# Patient Record
Sex: FEMALE | Race: WHITE | NOT HISPANIC OR LATINO | Employment: FULL TIME | URBAN - METROPOLITAN AREA
[De-identification: names, ages, dates, MRNs, and addresses within clinical notes are randomized per-mention and may not be internally consistent; named-entity substitution may affect disease eponyms.]

---

## 2019-06-12 ENCOUNTER — HOSPITAL ENCOUNTER (OUTPATIENT)
Dept: URGENT CARE | Facility: CLINIC | Age: 34
Discharge: HOME | End: 2019-06-12
Attending: FAMILY MEDICINE

## 2019-10-09 ENCOUNTER — HOSPITAL ENCOUNTER (OUTPATIENT)
Dept: URGENT CARE | Facility: CLINIC | Age: 34
Discharge: HOME | End: 2019-10-09

## 2019-10-11 LAB — BACTERIA SPEC AEROBE CULT: NORMAL

## 2020-02-18 ENCOUNTER — HOSPITAL ENCOUNTER (OUTPATIENT)
Dept: LAB | Facility: HOSPITAL | Age: 35
Discharge: HOME | End: 2020-02-18
Attending: NURSE PRACTITIONER

## 2020-02-18 LAB
25(OH)D3 SERPL-MCNC: 78.5 NG/ML (ref 30–100)
ALBUMIN SERPL-MCNC: 3.9 G/DL (ref 3.5–5)
ALBUMIN/GLOB SERPL: 1.5 {RATIO} (ref 1.4–2.6)
ALP SERPL-CCNC: 71 U/L (ref 42–98)
ALT SERPL-CCNC: 74 U/L (ref 10–40)
ANION GAP SERPL CALC-SCNC: 19 MMOL/L (ref 8–19)
AST SERPL-CCNC: 66 U/L (ref 15–50)
BASOPHILS # BLD AUTO: 0.04 10*3/UL (ref 0–0.2)
BASOPHILS NFR BLD AUTO: 0.6 % (ref 0–3)
BILIRUB SERPL-MCNC: 0.23 MG/DL (ref 0.2–1.3)
BUN SERPL-MCNC: 14 MG/DL (ref 5–25)
BUN/CREAT SERPL: 22 {RATIO} (ref 6–20)
CALCIUM SERPL-MCNC: 9.3 MG/DL (ref 8.7–10.4)
CHLORIDE SERPL-SCNC: 101 MMOL/L (ref 99–111)
CHOLEST SERPL-MCNC: 192 MG/DL (ref 107–200)
CHOLEST/HDLC SERPL: 4.1 {RATIO} (ref 3–6)
CONV ABS IMM GRAN: 0.03 10*3/UL (ref 0–0.2)
CONV CO2: 22 MMOL/L (ref 22–32)
CONV IMMATURE GRAN: 0.5 % (ref 0–1.8)
CONV TOTAL PROTEIN: 6.5 G/DL (ref 6.3–8.2)
CREAT UR-MCNC: 0.64 MG/DL (ref 0.5–0.9)
DEPRECATED RDW RBC AUTO: 40.6 FL (ref 36.4–46.3)
EOSINOPHIL # BLD AUTO: 0.17 10*3/UL (ref 0–0.7)
EOSINOPHIL # BLD AUTO: 2.6 % (ref 0–7)
ERYTHROCYTE [DISTWIDTH] IN BLOOD BY AUTOMATED COUNT: 11.9 % (ref 11.7–14.4)
EST. AVERAGE GLUCOSE BLD GHB EST-MCNC: 117 MG/DL
FOLATE SERPL-MCNC: >20 NG/ML (ref 4.8–20)
GFR SERPLBLD BASED ON 1.73 SQ M-ARVRAT: >60 ML/MIN/{1.73_M2}
GLOBULIN UR ELPH-MCNC: 2.6 G/DL (ref 2–3.5)
GLUCOSE SERPL-MCNC: 105 MG/DL (ref 65–99)
HBA1C MFR BLD: 5.7 % (ref 3.5–5.7)
HCT VFR BLD AUTO: 45 % (ref 37–47)
HDLC SERPL-MCNC: 47 MG/DL (ref 40–60)
HGB BLD-MCNC: 14.6 G/DL (ref 12–16)
LDLC SERPL CALC-MCNC: 101 MG/DL (ref 70–100)
LYMPHOCYTES # BLD AUTO: 2.41 10*3/UL (ref 1–5)
LYMPHOCYTES NFR BLD AUTO: 37 % (ref 20–45)
MCH RBC QN AUTO: 30.4 PG (ref 27–31)
MCHC RBC AUTO-ENTMCNC: 32.4 G/DL (ref 33–37)
MCV RBC AUTO: 93.6 FL (ref 81–99)
MONOCYTES # BLD AUTO: 0.47 10*3/UL (ref 0.2–1.2)
MONOCYTES NFR BLD AUTO: 7.2 % (ref 3–10)
NEUTROPHILS # BLD AUTO: 3.39 10*3/UL (ref 2–8)
NEUTROPHILS NFR BLD AUTO: 52.1 % (ref 30–85)
NRBC CBCN: 0 % (ref 0–0.7)
OSMOLALITY SERPL CALC.SUM OF ELEC: 287 MOSM/KG (ref 273–304)
PLATELET # BLD AUTO: 235 10*3/UL (ref 130–400)
PMV BLD AUTO: 9.6 FL (ref 9.4–12.3)
POTASSIUM SERPL-SCNC: 4 MMOL/L (ref 3.5–5.3)
RBC # BLD AUTO: 4.81 10*6/UL (ref 4.2–5.4)
SODIUM SERPL-SCNC: 138 MMOL/L (ref 135–147)
T4 FREE SERPL-MCNC: 1.1 NG/DL (ref 0.9–1.8)
TRIGL SERPL-MCNC: 220 MG/DL (ref 40–150)
TSH SERPL-ACNC: 3.65 M[IU]/L (ref 0.27–4.2)
VIT B12 SERPL-MCNC: 705 PG/ML (ref 211–911)
VLDLC SERPL-MCNC: 44 MG/DL (ref 5–37)
WBC # BLD AUTO: 6.51 10*3/UL (ref 4.8–10.8)

## 2020-06-17 ENCOUNTER — HOSPITAL ENCOUNTER (OUTPATIENT)
Dept: LAB | Facility: HOSPITAL | Age: 35
Discharge: HOME | End: 2020-06-17
Attending: NURSE PRACTITIONER

## 2020-06-17 LAB
BASOPHILS # BLD AUTO: 0.04 10*3/UL (ref 0–0.2)
BASOPHILS NFR BLD AUTO: 0.7 % (ref 0–3)
CONV ABS IMM GRAN: 0.01 10*3/UL (ref 0–0.2)
CONV IMMATURE GRAN: 0.2 % (ref 0–1.8)
DEPRECATED RDW RBC AUTO: 39.7 FL (ref 36.4–46.3)
EOSINOPHIL # BLD AUTO: 0.13 10*3/UL (ref 0–0.7)
EOSINOPHIL # BLD AUTO: 2.2 % (ref 0–7)
ERYTHROCYTE [DISTWIDTH] IN BLOOD BY AUTOMATED COUNT: 11.8 % (ref 11.7–14.4)
ERYTHROCYTE [SEDIMENTATION RATE] IN BLOOD: 8 MM/H (ref 0–20)
HCT VFR BLD AUTO: 45 % (ref 37–47)
HGB BLD-MCNC: 15 G/DL (ref 12–16)
LYMPHOCYTES # BLD AUTO: 1.81 10*3/UL (ref 1–5)
LYMPHOCYTES NFR BLD AUTO: 30.6 % (ref 20–45)
MCH RBC QN AUTO: 30.7 PG (ref 27–31)
MCHC RBC AUTO-ENTMCNC: 33.3 G/DL (ref 33–37)
MCV RBC AUTO: 92 FL (ref 81–99)
MONOCYTES # BLD AUTO: 0.46 10*3/UL (ref 0.2–1.2)
MONOCYTES NFR BLD AUTO: 7.8 % (ref 3–10)
NEUTROPHILS # BLD AUTO: 3.47 10*3/UL (ref 2–8)
NEUTROPHILS NFR BLD AUTO: 58.5 % (ref 30–85)
NRBC CBCN: 0 % (ref 0–0.7)
PLATELET # BLD AUTO: 235 10*3/UL (ref 130–400)
PMV BLD AUTO: 9.7 FL (ref 9.4–12.3)
RBC # BLD AUTO: 4.89 10*6/UL (ref 4.2–5.4)
WBC # BLD AUTO: 5.92 10*3/UL (ref 4.8–10.8)

## 2020-06-18 LAB
ASO AB SERPL-ACNC: 23 [IU]/ML (ref 0–200)
CONV RHEUMATOID FACTOR IGM: <10 [IU]/ML (ref 0–14)
CRP SERPL-MCNC: 15.1 MG/L (ref 0–5)
DSDNA AB SER-ACNC: NEGATIVE [IU]/ML
ENA AB SER IA-ACNC: NEGATIVE {RATIO}
URATE SERPL-MCNC: 6.5 MG/DL (ref 2.5–7.5)

## 2020-06-19 LAB
B BURGDOR IGG+IGM SER-ACNC: <0.91 ISR (ref 0–0.9)
CONV ANTI MICROSOMAL AB: <9 IU/ML (ref 0–34)

## 2020-09-23 ENCOUNTER — HOSPITAL ENCOUNTER (OUTPATIENT)
Dept: URGENT CARE | Facility: CLINIC | Age: 35
Discharge: HOME | End: 2020-09-23
Attending: EMERGENCY MEDICINE

## 2020-11-04 ENCOUNTER — HOSPITAL ENCOUNTER (OUTPATIENT)
Dept: URGENT CARE | Facility: CLINIC | Age: 35
Discharge: HOME | End: 2020-11-04
Attending: NURSE PRACTITIONER

## 2020-11-23 ENCOUNTER — HOSPITAL ENCOUNTER (OUTPATIENT)
Dept: URGENT CARE | Facility: CLINIC | Age: 35
Discharge: HOME | End: 2020-11-23

## 2020-11-25 LAB — BACTERIA UR CULT: NORMAL

## 2021-08-24 ENCOUNTER — TRANSCRIBE ORDERS (OUTPATIENT)
Dept: ADMINISTRATIVE | Facility: HOSPITAL | Age: 36
End: 2021-08-24

## 2021-08-24 DIAGNOSIS — R05.3 CHRONIC COUGH: Primary | ICD-10-CM

## 2021-08-24 DIAGNOSIS — J18.9 CHRONIC PNEUMONIA: ICD-10-CM

## 2021-08-24 DIAGNOSIS — R07.89 CHEST TIGHTNESS: ICD-10-CM

## 2021-11-29 ENCOUNTER — HOSPITAL ENCOUNTER (OUTPATIENT)
Dept: RESPIRATORY THERAPY | Facility: HOSPITAL | Age: 36
Discharge: HOME | End: 2021-11-29
Admitting: NURSE PRACTITIONER

## 2021-11-29 DIAGNOSIS — R05.3 CHRONIC COUGH: ICD-10-CM

## 2021-11-29 DIAGNOSIS — J18.9 CHRONIC PNEUMONIA: ICD-10-CM

## 2021-11-29 DIAGNOSIS — R07.89 CHEST TIGHTNESS: ICD-10-CM

## 2021-11-29 PROCEDURE — 94060 EVALUATION OF WHEEZING: CPT

## 2021-11-29 PROCEDURE — 94060 EVALUATION OF WHEEZING: CPT | Performed by: INTERNAL MEDICINE

## 2021-11-29 RX ORDER — ALBUTEROL SULFATE 2.5 MG/3ML
2.5 SOLUTION RESPIRATORY (INHALATION) ONCE
Status: COMPLETED | OUTPATIENT
Start: 2021-11-29 | End: 2021-11-29

## 2021-11-29 RX ADMIN — ALBUTEROL SULFATE 2.5 MG: 2.5 SOLUTION RESPIRATORY (INHALATION) at 12:53

## 2022-01-17 ENCOUNTER — OFFICE VISIT (OUTPATIENT)
Dept: GASTROENTEROLOGY | Facility: CLINIC | Age: 37
End: 2022-01-17

## 2022-01-17 VITALS
TEMPERATURE: 98.7 F | DIASTOLIC BLOOD PRESSURE: 86 MMHG | WEIGHT: 186.8 LBS | HEIGHT: 57 IN | HEART RATE: 99 BPM | SYSTOLIC BLOOD PRESSURE: 123 MMHG | OXYGEN SATURATION: 97 % | BODY MASS INDEX: 40.3 KG/M2

## 2022-01-17 DIAGNOSIS — R10.33 PERIUMBILICAL ABDOMINAL PAIN: ICD-10-CM

## 2022-01-17 DIAGNOSIS — R19.7 DIARRHEA, UNSPECIFIED TYPE: ICD-10-CM

## 2022-01-17 DIAGNOSIS — R79.89 ELEVATED LFTS: Primary | ICD-10-CM

## 2022-01-17 DIAGNOSIS — R53.81 MALAISE AND FATIGUE: ICD-10-CM

## 2022-01-17 DIAGNOSIS — R53.83 MALAISE AND FATIGUE: ICD-10-CM

## 2022-01-17 DIAGNOSIS — R11.0 NAUSEA: ICD-10-CM

## 2022-01-17 PROCEDURE — 99204 OFFICE O/P NEW MOD 45 MIN: CPT | Performed by: INTERNAL MEDICINE

## 2022-01-17 RX ORDER — MULTIPLE VITAMINS W/ MINERALS TAB 9MG-400MCG
1 TAB ORAL DAILY
COMMUNITY

## 2022-01-17 RX ORDER — LISINOPRIL 10 MG/1
10 TABLET ORAL DAILY
COMMUNITY
Start: 2021-10-18

## 2022-01-17 RX ORDER — MULTIVIT WITH MINERALS/LUTEIN
4000 TABLET ORAL DAILY
COMMUNITY
End: 2022-06-22

## 2022-01-17 RX ORDER — MONTELUKAST SODIUM 10 MG/1
10 TABLET ORAL NIGHTLY
COMMUNITY
Start: 2021-11-18

## 2022-01-17 RX ORDER — TOLTERODINE 4 MG/1
4 CAPSULE, EXTENDED RELEASE ORAL DAILY
COMMUNITY

## 2022-01-17 RX ORDER — ESTRADIOL 2 MG/1
2 TABLET ORAL DAILY
COMMUNITY
Start: 2021-10-27

## 2022-01-17 RX ORDER — SOLIFENACIN SUCCINATE 10 MG/1
10 TABLET, FILM COATED ORAL DAILY
COMMUNITY

## 2022-01-17 RX ORDER — OMEPRAZOLE 40 MG/1
40 CAPSULE, DELAYED RELEASE ORAL DAILY
COMMUNITY
Start: 2021-11-18

## 2022-01-17 RX ORDER — ERGOCALCIFEROL 1.25 MG/1
CAPSULE ORAL
COMMUNITY
End: 2022-06-22

## 2022-01-17 RX ORDER — LEVOCETIRIZINE DIHYDROCHLORIDE 5 MG/1
5 TABLET, FILM COATED ORAL EVERY EVENING
COMMUNITY
Start: 2021-10-18

## 2022-01-17 NOTE — PROGRESS NOTES
"Chief Complaint   Patient presents with   • Celiac Disease   • Abdominal Pain   • Migraine   • Nausea   • Constipation   • Diarrhea       History of Present Illness:   36 y.o. female NO abdominal pain. Lots of headaches, nausea, she has alternating diarrhea and constipation. Fatigue. She had an EGD in . No rectal bleeeding or melena. Her weight fluctuates but has been stable. Denies NSAIDs use. She has 0-1 BM/day. May have 2 days of diarrhea.        She supposedly had a celiac sprue antibody test done by her PCP that was positive. She was also noted to have elevated LFT\"s. She was told that she has chronic mononuclesis. She itches for most of her life. She has a rash. Nonsmoker. No ETOH. 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 . LMP  when she had a hysterectomy. Has endometriosis. .     Past Medical History:   Diagnosis Date   • GERD (gastroesophageal reflux disease)    • Kidney stones    • Monocephalus    • PCOS (polycystic ovarian syndrome)        Past Surgical History:   Procedure Laterality Date   • CHOLECYSTECTOMY     • FOOT SURGERY     • HYSTERECTOMY     • UPPER GASTROINTESTINAL ENDOSCOPY           Current Outpatient Medications:   •  ergocalciferol (ERGOCALCIFEROL) 1.25 MG (85126 UT) capsule, , Disp: , Rfl:   •  estradiol (ESTRACE) 2 MG tablet, , Disp: , Rfl:   •  levocetirizine (XYZAL) 5 MG tablet, , Disp: , Rfl:   •  lisinopril (PRINIVIL,ZESTRIL) 10 MG tablet, , Disp: , Rfl:   •  metFORMIN (GLUCOPHAGE) 500 MG tablet, , Disp: , Rfl:   •  montelukast (SINGULAIR) 10 MG tablet, , Disp: , Rfl:   •  multivitamin with minerals tablet tablet, Take 1 tablet by mouth Daily., Disp: , Rfl:   •  omeprazole (priLOSEC) 40 MG capsule, , Disp: , Rfl:   •  solifenacin (VESICARE) 10 MG tablet, Take 10 mg by mouth Daily., Disp: , Rfl:   •  tolterodine LA (DETROL LA) 4 MG 24 hr capsule, Take 4 mg by mouth Daily., Disp: , Rfl:   •  vitamin C (ASCORBIC ACID) 250 MG tablet, Take 4,000 mg by mouth Daily., Disp: , Rfl:     No " Known Allergies    Family History   Problem Relation Age of Onset   • Colon cancer Maternal Grandmother    • Pancreatic cancer Paternal Grandmother        Social History     Socioeconomic History   • Marital status:    Tobacco Use   • Smoking status: Never Smoker   • Smokeless tobacco: Never Used   Substance and Sexual Activity   • Alcohol use: Never   • Drug use: Never       Review of Systems   Gastrointestinal: Positive for constipation and diarrhea.   All other systems reviewed and are negative.    Pertinent positives and negatives documented in the HPI and all other systems reviewed and were found to be negative.  Vitals:    01/17/22 1047   BP: 123/86   Pulse: 99   Temp: 98.7 °F (37.1 °C)   SpO2: 97%       Physical Exam  Vitals reviewed.   Constitutional:       General: She is not in acute distress.     Appearance: Normal appearance. She is well-developed. She is not diaphoretic.   HENT:      Head: Normocephalic and atraumatic. Hair is normal.      Right Ear: Hearing, tympanic membrane, ear canal and external ear normal. No decreased hearing noted. No drainage.      Left Ear: Hearing, tympanic membrane, ear canal and external ear normal. No decreased hearing noted.      Nose: Nose normal. No nasal deformity.      Mouth/Throat:      Mouth: Mucous membranes are moist.   Eyes:      General: Lids are normal.         Right eye: No discharge.         Left eye: No discharge.      Extraocular Movements: Extraocular movements intact.      Conjunctiva/sclera: Conjunctivae normal.      Pupils: Pupils are equal, round, and reactive to light.   Neck:      Thyroid: No thyromegaly.      Vascular: No JVD.      Trachea: No tracheal deviation.   Cardiovascular:      Rate and Rhythm: Normal rate and regular rhythm.      Pulses: Normal pulses.      Heart sounds: Normal heart sounds. No murmur heard.  No friction rub. No gallop.    Pulmonary:      Effort: Pulmonary effort is normal. No respiratory distress.      Breath  sounds: Normal breath sounds. No wheezing or rales.   Chest:      Chest wall: No tenderness.   Abdominal:      General: Bowel sounds are normal. There is no distension.      Palpations: Abdomen is soft. There is no mass.      Tenderness: There is no abdominal tenderness. There is no guarding or rebound.      Hernia: No hernia is present.   Genitourinary:     Rectum: Normal. Guaiac result negative.   Musculoskeletal:         General: No tenderness or deformity. Normal range of motion.      Cervical back: Normal range of motion and neck supple.   Lymphadenopathy:      Cervical: No cervical adenopathy.   Skin:     General: Skin is warm and dry.      Findings: No erythema or rash.   Neurological:      Mental Status: She is alert and oriented to person, place, and time.      Cranial Nerves: No cranial nerve deficit.      Motor: No abnormal muscle tone.      Coordination: Coordination normal.      Deep Tendon Reflexes: Reflexes are normal and symmetric. Reflexes normal.   Psychiatric:         Mood and Affect: Mood normal.         Behavior: Behavior normal.         Thought Content: Thought content normal.         Judgment: Judgment normal.         Diagnoses and all orders for this visit:    1. Elevated LFTs (Primary)  -     CBC & Differential  -     Comprehensive Metabolic Panel  -     Celiac Ab tTG DGP TIgA  -     TSH  -     Alpha - 1 - Antitrypsin  -     IRENE  -     Anti-Smooth Muscle Antibody Titer  -     Ceruloplasmin  -     Mitochondrial Antibodies, M2  -     Protein Elec + Interp, Serum  -     Hepatitis Panel, Acute  -     CT Abdomen Pelvis With Contrast; Future    2. Diarrhea, unspecified type  -     CBC & Differential  -     Comprehensive Metabolic Panel  -     Celiac Ab tTG DGP TIgA  -     TSH  -     Alpha - 1 - Antitrypsin  -     IRENE  -     Anti-Smooth Muscle Antibody Titer  -     Ceruloplasmin  -     Mitochondrial Antibodies, M2  -     Protein Elec + Interp, Serum  -     Hepatitis Panel, Acute    3. Malaise and  "fatigue  -     CBC & Differential  -     Comprehensive Metabolic Panel  -     Celiac Ab tTG DGP TIgA  -     TSH  -     Alpha - 1 - Antitrypsin  -     IRENE  -     Anti-Smooth Muscle Antibody Titer  -     Ceruloplasmin  -     Mitochondrial Antibodies, M2  -     Protein Elec + Interp, Serum  -     Hepatitis Panel, Acute    4. Nausea  -     CBC & Differential  -     Comprehensive Metabolic Panel  -     Celiac Ab tTG DGP TIgA  -     TSH  -     Alpha - 1 - Antitrypsin  -     IRENE  -     Anti-Smooth Muscle Antibody Titer  -     Ceruloplasmin  -     Mitochondrial Antibodies, M2  -     Protein Elec + Interp, Serum  -     Hepatitis Panel, Acute  -     CT Abdomen Pelvis With Contrast; Future    5. Periumbilical abdominal pain  -     CT Abdomen Pelvis With Contrast; Future      Assessment:  1. Elevated LFT\"s. She was told that she has fatty liver in 2007.  2. +celiac sprue test?  3. She itches and has rash  4. FAtigue  5. Nausea  6. Alternating diarrhea and consitpation.  7. Periumbilical pain and upper abdominal pain.     Recommendations:  1. I recommend that she go see a Dermatologist about skin rash  2. Labs: Celiac sprue antibody panel, CMP, CBC, TSH, hep profile, IRENE, AMA, etc  3. CT abd/pelvis  4. F/u 6 weeks. She will probably need an EGD (and possibly a colonoscopy).     Return in about 4 weeks (around 2/14/2022).    Gigi Griffiths MD  1/17/2022  "

## 2022-01-18 LAB
A1AT SERPL-MCNC: 189 MG/DL (ref 100–188)
ACTIN IGG SERPL-ACNC: 4 UNITS (ref 0–19)
ALBUMIN SERPL ELPH-MCNC: 4.1 G/DL (ref 2.9–4.4)
ALBUMIN SERPL-MCNC: 4.5 G/DL (ref 3.8–4.8)
ALBUMIN/GLOB SERPL: 1.4 {RATIO} (ref 0.7–1.7)
ALBUMIN/GLOB SERPL: 1.7 {RATIO} (ref 1.2–2.2)
ALP SERPL-CCNC: 82 IU/L (ref 44–121)
ALPHA1 GLOB SERPL ELPH-MCNC: 0.3 G/DL (ref 0–0.4)
ALPHA2 GLOB SERPL ELPH-MCNC: 0.9 G/DL (ref 0.4–1)
ALT SERPL-CCNC: 72 IU/L (ref 0–32)
ANA SER QL: NEGATIVE
AST SERPL-CCNC: 66 IU/L (ref 0–40)
B-GLOBULIN SERPL ELPH-MCNC: 1.1 G/DL (ref 0.7–1.3)
BASOPHILS # BLD AUTO: 0.1 X10E3/UL (ref 0–0.2)
BASOPHILS NFR BLD AUTO: 1 %
BILIRUB SERPL-MCNC: 0.3 MG/DL (ref 0–1.2)
BUN SERPL-MCNC: 7 MG/DL (ref 6–20)
BUN/CREAT SERPL: 9 (ref 9–23)
CALCIUM SERPL-MCNC: 9.7 MG/DL (ref 8.7–10.2)
CERULOPLASMIN SERPL-MCNC: 29.1 MG/DL (ref 19–39)
CHLORIDE SERPL-SCNC: 103 MMOL/L (ref 96–106)
CO2 SERPL-SCNC: 21 MMOL/L (ref 20–29)
CREAT SERPL-MCNC: 0.75 MG/DL (ref 0.57–1)
EOSINOPHIL # BLD AUTO: 0.1 X10E3/UL (ref 0–0.4)
EOSINOPHIL NFR BLD AUTO: 2 %
ERYTHROCYTE [DISTWIDTH] IN BLOOD BY AUTOMATED COUNT: 11.9 % (ref 11.7–15.4)
GAMMA GLOB SERPL ELPH-MCNC: 0.8 G/DL (ref 0.4–1.8)
GLIADIN PEPTIDE IGA SER-ACNC: 3 UNITS (ref 0–19)
GLIADIN PEPTIDE IGG SER-ACNC: 2 UNITS (ref 0–19)
GLOBULIN SER CALC-MCNC: 2.6 G/DL (ref 1.5–4.5)
GLOBULIN SER CALC-MCNC: 3 G/DL (ref 2.2–3.9)
GLUCOSE SERPL-MCNC: 90 MG/DL (ref 65–99)
HAV IGM SERPL QL IA: NEGATIVE
HBV CORE IGM SERPL QL IA: NEGATIVE
HBV SURFACE AG SERPL QL IA: NEGATIVE
HCT VFR BLD AUTO: 45.5 % (ref 34–46.6)
HCV AB S/CO SERPL IA: <0.1 S/CO RATIO (ref 0–0.9)
HGB BLD-MCNC: 15.7 G/DL (ref 11.1–15.9)
IGA SERPL-MCNC: 88 MG/DL (ref 87–352)
IMM GRANULOCYTES # BLD AUTO: 0 X10E3/UL (ref 0–0.1)
IMM GRANULOCYTES NFR BLD AUTO: 0 %
LABORATORY COMMENT REPORT: NORMAL
LYMPHOCYTES # BLD AUTO: 1.9 X10E3/UL (ref 0.7–3.1)
LYMPHOCYTES NFR BLD AUTO: 26 %
M PROTEIN SERPL ELPH-MCNC: NORMAL G/DL
MCH RBC QN AUTO: 30.9 PG (ref 26.6–33)
MCHC RBC AUTO-ENTMCNC: 34.5 G/DL (ref 31.5–35.7)
MCV RBC AUTO: 90 FL (ref 79–97)
MITOCHONDRIA M2 IGG SER-ACNC: <20 UNITS (ref 0–20)
MONOCYTES # BLD AUTO: 0.5 X10E3/UL (ref 0.1–0.9)
MONOCYTES NFR BLD AUTO: 7 %
NEUTROPHILS # BLD AUTO: 4.7 X10E3/UL (ref 1.4–7)
NEUTROPHILS NFR BLD AUTO: 64 %
PLATELET # BLD AUTO: 242 X10E3/UL (ref 150–450)
POTASSIUM SERPL-SCNC: 4.7 MMOL/L (ref 3.5–5.2)
PROT PATTERN SERPL ELPH-IMP: NORMAL
PROT SERPL-MCNC: 7.1 G/DL (ref 6–8.5)
RBC # BLD AUTO: 5.08 X10E6/UL (ref 3.77–5.28)
SODIUM SERPL-SCNC: 140 MMOL/L (ref 134–144)
TSH SERPL DL<=0.005 MIU/L-ACNC: 1.31 UIU/ML (ref 0.45–4.5)
TTG IGA SER-ACNC: <2 U/ML (ref 0–3)
TTG IGG SER-ACNC: <2 U/ML (ref 0–5)
WBC # BLD AUTO: 7.4 X10E3/UL (ref 3.4–10.8)

## 2022-01-24 ENCOUNTER — TELEPHONE (OUTPATIENT)
Dept: GASTROENTEROLOGY | Facility: CLINIC | Age: 37
End: 2022-01-24

## 2022-01-24 NOTE — PROGRESS NOTES
01/24/22       Tell her that the following lab tests came back normal: Her celiac sprue antibody panel was normal, also serum protein electrophoresis, antinuclear antibody, smooth muscle antibody, antimitochondrial antibody, CBC, her comprehensive metabolic profile was normal except her AST was 66, ALT is 72, and other liver function tests were normal.  Her TSH was normal.  Her alpha-1 antitrypsin level was not low (which is good), ceruloplasmin was normal, and her hepatitis profile was negative.       We will see what the CT of the abdomen and pelvis shows?       Please send a copy of this report to her PCP.  Lily kjcharli

## 2022-01-24 NOTE — TELEPHONE ENCOUNTER
Called pt and advised of Dr Griffiths's note.Verb understanding.     Results sent to NORI Leigh NP thru epic.

## 2022-02-01 ENCOUNTER — HOSPITAL ENCOUNTER (OUTPATIENT)
Dept: CT IMAGING | Facility: HOSPITAL | Age: 37
Discharge: HOME | End: 2022-02-01
Admitting: INTERNAL MEDICINE

## 2022-02-01 DIAGNOSIS — R11.0 NAUSEA: ICD-10-CM

## 2022-02-01 DIAGNOSIS — R10.33 PERIUMBILICAL ABDOMINAL PAIN: ICD-10-CM

## 2022-02-01 DIAGNOSIS — R79.89 ELEVATED LFTS: ICD-10-CM

## 2022-02-01 PROCEDURE — 74178 CT ABD&PLV WO CNTR FLWD CNTR: CPT

## 2022-02-01 PROCEDURE — 0 IOPAMIDOL PER 1 ML: Performed by: INTERNAL MEDICINE

## 2022-02-01 RX ADMIN — IOPAMIDOL 100 ML: 755 INJECTION, SOLUTION INTRAVENOUS at 08:36

## 2022-02-03 NOTE — PROGRESS NOTES
02/03/22       Tell her that her CAT scan of the abdomen shows that she has a fatty liver.  She also has bilateral kidney stones.  Has she ever seen a Urologist about her kidney stones?  I think she should either talk to her PCP about possibly going to see a Urologist about her kidney stones or we could schedule her to see a Urologist (with [facility])?       I also recommend weight loss to help get rid of the fatty liver.         If she is OK with this I would recommend that we do an EGD and colonoscopy to evaluate her nausea, abdominal pain, nausea and vomiting. If she is OK with that then please schedule these.       FAX to her PCP.   Thx. kj

## 2022-02-04 ENCOUNTER — TELEPHONE (OUTPATIENT)
Dept: GASTROENTEROLOGY | Facility: CLINIC | Age: 37
End: 2022-02-04

## 2022-02-04 DIAGNOSIS — R19.7 DIARRHEA, UNSPECIFIED TYPE: Primary | ICD-10-CM

## 2022-02-04 DIAGNOSIS — R10.33 PERIUMBILICAL ABDOMINAL PAIN: ICD-10-CM

## 2022-02-04 DIAGNOSIS — R11.0 NAUSEA: ICD-10-CM

## 2022-02-04 NOTE — TELEPHONE ENCOUNTER
Called pt and advised of Dr Griffiths's note. Pt verb understanding.     Pt reports that she has has hx of kidney stones and see Dr Calle in [place]. Pt states she will give them a call.     Also pt is willing to proceed with egd and c/s . Advised pt someone from scheduling will reach out to her to schedule. Verb understanding.     Results sen to NORI Leigh NP thru epic.     Update sent to Dr Griffiths. Egd and c/s case request placed.

## 2022-02-04 NOTE — TELEPHONE ENCOUNTER
----- Message from Gigi Griffiths MD sent at 2/3/2022  8:50 AM EST -----  02/03/22       Tell her that her CAT scan of the abdomen shows that she has a fatty liver.  She also has bilateral kidney stones.  Has she ever seen a Urologist about her kidney stones?  I think she should either talk to her PCP about possibly going to see a Urologist about her kidney stones or we could schedule her to see a Urologist (with [facility])?       I also recommend weight loss to help get rid of the fatty liver.         If she is OK with this I would recommend that we do an EGD and colonoscopy to evaluate her nausea, abdominal pain, nausea and vomiting. If she is OK with that then please schedule these.       FAX to her PCP.   Dcx. kj

## 2022-02-09 ENCOUNTER — TELEPHONE (OUTPATIENT)
Dept: GASTROENTEROLOGY | Facility: CLINIC | Age: 37
End: 2022-02-09

## 2022-02-09 NOTE — TELEPHONE ENCOUNTER
Called patient this morning to reschedule appointment; no voicemail is set up on phone to leave message.

## 2022-02-22 ENCOUNTER — TELEPHONE (OUTPATIENT)
Dept: GASTROENTEROLOGY | Facility: CLINIC | Age: 37
End: 2022-02-22

## 2022-02-22 PROBLEM — R19.7 DIARRHEA: Status: ACTIVE | Noted: 2022-02-22

## 2022-02-22 PROBLEM — R11.0 NAUSEA: Status: ACTIVE | Noted: 2022-02-22

## 2022-02-22 PROBLEM — R10.33 PERIUMBILICAL ABDOMINAL PAIN: Status: ACTIVE | Noted: 2022-02-22

## 2022-02-22 NOTE — TELEPHONE ENCOUNTER
ODALIS patient via telephone for EGD/CS. Scheduled 06/23/2022 with arrival time 2:00pm. Prep packet mailed to verified address on file. Also advised arrival time may change based on [facility] guidelines. ODALIS Arellano--Cristobal

## 2022-03-01 PROCEDURE — U0004 COV-19 TEST NON-CDC HGH THRU: HCPCS | Performed by: PHYSICIAN ASSISTANT

## 2022-03-02 ENCOUNTER — TELEPHONE (OUTPATIENT)
Dept: URGENT CARE | Facility: CLINIC | Age: 37
End: 2022-03-02

## 2022-03-02 NOTE — TELEPHONE ENCOUNTER
----- Message from ROMMEL Miller sent at 3/2/2022  9:34 AM EST -----  Please call the patient regarding her negative result.

## 2022-06-16 ENCOUNTER — TRANSCRIBE ORDERS (OUTPATIENT)
Dept: ADMINISTRATIVE | Facility: HOSPITAL | Age: 37
End: 2022-06-16

## 2022-06-16 DIAGNOSIS — Z01.818 OTHER SPECIFIED PRE-OPERATIVE EXAMINATION: Primary | ICD-10-CM

## 2022-06-21 ENCOUNTER — LAB (OUTPATIENT)
Dept: LAB | Facility: HOSPITAL | Age: 37
End: 2022-06-21

## 2022-06-21 DIAGNOSIS — Z01.818 OTHER SPECIFIED PRE-OPERATIVE EXAMINATION: ICD-10-CM

## 2022-06-21 LAB — SARS-COV-2 RNA PNL SPEC NAA+PROBE: NOT DETECTED

## 2022-06-21 PROCEDURE — U0004 COV-19 TEST NON-CDC HGH THRU: HCPCS

## 2022-06-22 RX ORDER — CHOLECALCIFEROL (VITAMIN D3) 50 MCG
2000 TABLET ORAL DAILY
COMMUNITY

## 2022-06-22 RX ORDER — MULTIVIT WITH MINERALS/LUTEIN
4000 TABLET ORAL DAILY
COMMUNITY

## 2022-06-23 ENCOUNTER — ANESTHESIA EVENT (OUTPATIENT)
Dept: GASTROENTEROLOGY | Facility: HOSPITAL | Age: 37
End: 2022-06-23

## 2022-06-23 ENCOUNTER — ANESTHESIA (OUTPATIENT)
Dept: GASTROENTEROLOGY | Facility: HOSPITAL | Age: 37
End: 2022-06-23

## 2022-06-23 ENCOUNTER — HOSPITAL ENCOUNTER (OUTPATIENT)
Facility: HOSPITAL | Age: 37
Setting detail: HOSPITAL OUTPATIENT SURGERY
Discharge: HOME | End: 2022-06-23
Attending: INTERNAL MEDICINE | Admitting: INTERNAL MEDICINE

## 2022-06-23 VITALS
OXYGEN SATURATION: 97 % | TEMPERATURE: 98.9 F | DIASTOLIC BLOOD PRESSURE: 84 MMHG | HEART RATE: 71 BPM | RESPIRATION RATE: 14 BRPM | SYSTOLIC BLOOD PRESSURE: 130 MMHG | WEIGHT: 181 LBS | BODY MASS INDEX: 39.05 KG/M2 | HEIGHT: 57 IN

## 2022-06-23 DIAGNOSIS — R10.33 PERIUMBILICAL ABDOMINAL PAIN: ICD-10-CM

## 2022-06-23 DIAGNOSIS — R11.0 NAUSEA: ICD-10-CM

## 2022-06-23 DIAGNOSIS — R19.7 DIARRHEA, UNSPECIFIED TYPE: ICD-10-CM

## 2022-06-23 PROCEDURE — 25010000002 PROPOFOL 10 MG/ML EMULSION: Performed by: ANESTHESIOLOGY

## 2022-06-23 PROCEDURE — 43239 EGD BIOPSY SINGLE/MULTIPLE: CPT | Performed by: INTERNAL MEDICINE

## 2022-06-23 PROCEDURE — 88305 TISSUE EXAM BY PATHOLOGIST: CPT | Performed by: INTERNAL MEDICINE

## 2022-06-23 PROCEDURE — 45380 COLONOSCOPY AND BIOPSY: CPT | Performed by: INTERNAL MEDICINE

## 2022-06-23 RX ORDER — SODIUM CHLORIDE, SODIUM LACTATE, POTASSIUM CHLORIDE, CALCIUM CHLORIDE 600; 310; 30; 20 MG/100ML; MG/100ML; MG/100ML; MG/100ML
30 INJECTION, SOLUTION INTRAVENOUS CONTINUOUS PRN
Status: DISCONTINUED | OUTPATIENT
Start: 2022-06-23 | End: 2022-06-23 | Stop reason: HOSPADM

## 2022-06-23 RX ORDER — PROPOFOL 10 MG/ML
VIAL (ML) INTRAVENOUS AS NEEDED
Status: DISCONTINUED | OUTPATIENT
Start: 2022-06-23 | End: 2022-06-23 | Stop reason: SURG

## 2022-06-23 RX ORDER — SODIUM CHLORIDE 0.9 % (FLUSH) 0.9 %
10 SYRINGE (ML) INJECTION AS NEEDED
Status: DISCONTINUED | OUTPATIENT
Start: 2022-06-23 | End: 2022-06-23 | Stop reason: HOSPADM

## 2022-06-23 RX ORDER — LIDOCAINE HYDROCHLORIDE 20 MG/ML
INJECTION, SOLUTION INFILTRATION; PERINEURAL AS NEEDED
Status: DISCONTINUED | OUTPATIENT
Start: 2022-06-23 | End: 2022-06-23 | Stop reason: SURG

## 2022-06-23 RX ORDER — PROPOFOL 10 MG/ML
VIAL (ML) INTRAVENOUS CONTINUOUS PRN
Status: DISCONTINUED | OUTPATIENT
Start: 2022-06-23 | End: 2022-06-23 | Stop reason: SURG

## 2022-06-23 RX ADMIN — Medication 200 MCG/KG/MIN: at 15:56

## 2022-06-23 RX ADMIN — PROPOFOL 100 MG: 10 INJECTION, EMULSION INTRAVENOUS at 15:53

## 2022-06-23 RX ADMIN — LIDOCAINE HYDROCHLORIDE 100 MG: 20 INJECTION, SOLUTION INFILTRATION; PERINEURAL at 15:55

## 2022-06-23 RX ADMIN — SODIUM CHLORIDE, POTASSIUM CHLORIDE, SODIUM LACTATE AND CALCIUM CHLORIDE 30 ML/HR: 600; 310; 30; 20 INJECTION, SOLUTION INTRAVENOUS at 15:30

## 2022-06-23 NOTE — ANESTHESIA PREPROCEDURE EVALUATION
Anesthesia Evaluation     Patient summary reviewed and Nursing notes reviewed   history of anesthetic complications: PONV  NPO Solid Status: > 8 hours  NPO Liquid Status: > 4 hours           Airway   Mallampati: II  Neck ROM: full  No difficulty expected  Dental - normal exam     Pulmonary     breath sounds clear to auscultation  (+) shortness of breath,   Cardiovascular     Rhythm: regular    (+) hypertension,       Neuro/Psych  (+) headaches,    GI/Hepatic/Renal/Endo    (+) obesity, morbid obesity, GERD,  renal disease, diabetes mellitus,     Musculoskeletal     Abdominal   (+) obese,    Substance History      OB/GYN          Other                        Anesthesia Plan    ASA 3     MAC     intravenous induction     Anesthetic plan, risks, benefits, and alternatives have been provided, discussed and informed consent has been obtained with: patient.        CODE STATUS:

## 2022-06-23 NOTE — ANESTHESIA POSTPROCEDURE EVALUATION
"Patient: Delmi Phillips    Procedure Summary     Date: 06/23/22 Room / Location:  [facility]    Anesthesia Start: 1550 Anesthesia Stop: 1627    Procedures:       ESOPHAGOGASTRODUODENOSCOPY with biopsies (cold bx) (N/A Esophagus)      COLONOSCOPY to terminal ileum with biopsies (cold bx) (N/A ) Diagnosis:       Diarrhea, unspecified type      Nausea      Periumbilical abdominal pain      (Diarrhea, unspecified type [R19.7])      (Nausea [R11.0])      (Periumbilical abdominal pain [R10.33])    Surgeons: Gigi Griffiths MD Provider: August Barney MD    Anesthesia Type: MAC ASA Status: 3          Anesthesia Type: MAC    Vitals  Vitals Value Taken Time   /84 06/23/22 1646   Temp     Pulse 71 06/23/22 1646   Resp 14 06/23/22 1646   SpO2 97 % 06/23/22 1646           Post Anesthesia Care and Evaluation    Patient location during evaluation: bedside  Patient participation: complete - patient participated  Level of consciousness: sleepy but conscious  Pain score: 0  Pain management: adequate    Airway patency: patent  Anesthetic complications: No anesthetic complications    Cardiovascular status: acceptable  Respiratory status: acceptable  Hydration status: acceptable    Comments: /84 (BP Location: Left arm, Patient Position: Lying)   Pulse 71   Temp 37.2 °C (98.9 °F) (Oral)   Resp 14   Ht 144.8 cm (57\")   Wt 82.1 kg (181 lb)   SpO2 97%   BMI 39.17 kg/m²         "

## 2022-06-23 NOTE — H&P
"Chief Complaint   Patient presents with   • Celiac Disease   • Abdominal Pain   • Migraine   • Nausea   • Constipation   • Diarrhea         History of Present Illness:   36 y.o. female NO abdominal pain. Lots of headaches, nausea, she has alternating diarrhea and constipation. Fatigue. She had an EGD in . No rectal bleeeding or melena. Her weight fluctuates but has been stable. Denies NSAIDs use. She has 0-1 BM/day. May have 2 days of diarrhea.        She supposedly had a celiac sprue antibody test done by her PCP that was positive. She was also noted to have elevated LFT\"s. She was told that she has chronic mononuclesis. She itches for most of her life. She has a rash. Nonsmoker. No ETOH. 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 . LMP  when she had a hysterectomy. Has endometriosis. .      Medical History        Past Medical History:   Diagnosis Date   • GERD (gastroesophageal reflux disease)     • Kidney stones     • Monocephalus     • PCOS (polycystic ovarian syndrome)              Surgical History         Past Surgical History:   Procedure Laterality Date   • CHOLECYSTECTOMY       • FOOT SURGERY       • HYSTERECTOMY       • UPPER GASTROINTESTINAL ENDOSCOPY                  Current Outpatient Medications:   •  ergocalciferol (ERGOCALCIFEROL) 1.25 MG (21657 UT) capsule, , Disp: , Rfl:   •  estradiol (ESTRACE) 2 MG tablet, , Disp: , Rfl:   •  levocetirizine (XYZAL) 5 MG tablet, , Disp: , Rfl:   •  lisinopril (PRINIVIL,ZESTRIL) 10 MG tablet, , Disp: , Rfl:   •  metFORMIN (GLUCOPHAGE) 500 MG tablet, , Disp: , Rfl:   •  montelukast (SINGULAIR) 10 MG tablet, , Disp: , Rfl:   •  multivitamin with minerals tablet tablet, Take 1 tablet by mouth Daily., Disp: , Rfl:   •  omeprazole (priLOSEC) 40 MG capsule, , Disp: , Rfl:   •  solifenacin (VESICARE) 10 MG tablet, Take 10 mg by mouth Daily., Disp: , Rfl:   •  tolterodine LA (DETROL LA) 4 MG 24 hr capsule, Take 4 mg by mouth Daily., Disp: , Rfl:   •  vitamin C " (ASCORBIC ACID) 250 MG tablet, Take 4,000 mg by mouth Daily., Disp: , Rfl:      No Known Allergies           Family History   Problem Relation Age of Onset   • Colon cancer Maternal Grandmother     • Pancreatic cancer Paternal Grandmother           Social History   Social History           Socioeconomic History   • Marital status:    Tobacco Use   • Smoking status: Never Smoker   • Smokeless tobacco: Never Used   Substance and Sexual Activity   • Alcohol use: Never   • Drug use: Never            Review of Systems   Gastrointestinal: Positive for constipation and diarrhea.   All other systems reviewed and are negative.     Pertinent positives and negatives documented in the HPI and all other systems reviewed and were found to be negative.      Vitals:     01/17/22 1047   BP: 123/86   Pulse: 99   Temp: 98.7 °F (37.1 °C)   SpO2: 97%         Physical Exam  Vitals reviewed.   Constitutional:       General: She is not in acute distress.     Appearance: Normal appearance. She is well-developed. She is not diaphoretic.   HENT:      Head: Normocephalic and atraumatic. Hair is normal.      Right Ear: Hearing, tympanic membrane, ear canal and external ear normal. No decreased hearing noted. No drainage.      Left Ear: Hearing, tympanic membrane, ear canal and external ear normal. No decreased hearing noted.      Nose: Nose normal. No nasal deformity.      Mouth/Throat:      Mouth: Mucous membranes are moist.   Eyes:      General: Lids are normal.         Right eye: No discharge.         Left eye: No discharge.      Extraocular Movements: Extraocular movements intact.      Conjunctiva/sclera: Conjunctivae normal.      Pupils: Pupils are equal, round, and reactive to light.   Neck:      Thyroid: No thyromegaly.      Vascular: No JVD.      Trachea: No tracheal deviation.   Cardiovascular:      Rate and Rhythm: Normal rate and regular rhythm.      Pulses: Normal pulses.      Heart sounds: Normal heart sounds. No murmur  heard.  No friction rub. No gallop.    Pulmonary:      Effort: Pulmonary effort is normal. No respiratory distress.      Breath sounds: Normal breath sounds. No wheezing or rales.   Chest:      Chest wall: No tenderness.   Abdominal:      General: Bowel sounds are normal. There is no distension.      Palpations: Abdomen is soft. There is no mass.      Tenderness: There is no abdominal tenderness. There is no guarding or rebound.      Hernia: No hernia is present.   Genitourinary:     Rectum: Normal. Guaiac result negative.   Musculoskeletal:         General: No tenderness or deformity. Normal range of motion.      Cervical back: Normal range of motion and neck supple.   Lymphadenopathy:      Cervical: No cervical adenopathy.   Skin:     General: Skin is warm and dry.      Findings: No erythema or rash.   Neurological:      Mental Status: She is alert and oriented to person, place, and time.      Cranial Nerves: No cranial nerve deficit.      Motor: No abnormal muscle tone.      Coordination: Coordination normal.      Deep Tendon Reflexes: Reflexes are normal and symmetric. Reflexes normal.   Psychiatric:         Mood and Affect: Mood normal.         Behavior: Behavior normal.         Thought Content: Thought content normal.         Judgment: Judgment normal.            Diagnoses and all orders for this visit:     1. Elevated LFTs (Primary)  -     CBC & Differential  -     Comprehensive Metabolic Panel  -     Celiac Ab tTG DGP TIgA  -     TSH  -     Alpha - 1 - Antitrypsin  -     IRENE  -     Anti-Smooth Muscle Antibody Titer  -     Ceruloplasmin  -     Mitochondrial Antibodies, M2  -     Protein Elec + Interp, Serum  -     Hepatitis Panel, Acute  -     CT Abdomen Pelvis With Contrast; Future     2. Diarrhea, unspecified type  -     CBC & Differential  -     Comprehensive Metabolic Panel  -     Celiac Ab tTG DGP TIgA  -     TSH  -     Alpha - 1 - Antitrypsin  -     IRENE  -     Anti-Smooth Muscle Antibody Titer  -      "Ceruloplasmin  -     Mitochondrial Antibodies, M2  -     Protein Elec + Interp, Serum  -     Hepatitis Panel, Acute     3. Malaise and fatigue  -     CBC & Differential  -     Comprehensive Metabolic Panel  -     Celiac Ab tTG DGP TIgA  -     TSH  -     Alpha - 1 - Antitrypsin  -     IRENE  -     Anti-Smooth Muscle Antibody Titer  -     Ceruloplasmin  -     Mitochondrial Antibodies, M2  -     Protein Elec + Interp, Serum  -     Hepatitis Panel, Acute     4. Nausea  -     CBC & Differential  -     Comprehensive Metabolic Panel  -     Celiac Ab tTG DGP TIgA  -     TSH  -     Alpha - 1 - Antitrypsin  -     IRENE  -     Anti-Smooth Muscle Antibody Titer  -     Ceruloplasmin  -     Mitochondrial Antibodies, M2  -     Protein Elec + Interp, Serum  -     Hepatitis Panel, Acute  -     CT Abdomen Pelvis With Contrast; Future     5. Periumbilical abdominal pain  -     CT Abdomen Pelvis With Contrast; Future        Assessment:  1. Elevated LFT\"s. She was told that she has fatty liver in 2007.  2. +celiac sprue test?  3. She itches and has rash  4. FAtigue  5. Nausea  6. Alternating diarrhea and consitpation.  7. Periumbilical pain and upper abdominal pain.      Recommendations:  1. I recommend that she go see a Dermatologist about skin rash  2. Labs: Celiac sprue antibody panel, CMP, CBC, TSH, hep profile, IRENE, AMA, etc  3. CT abd/pelvis  4. F/u 6 weeks. She will probably need an EGD (and possibly a colonoscopy).      Return in about 4 weeks (around 2/14/2022).     6/23/22 - No change from the above H and P.   Gigi Griffiths MD      "

## 2022-06-27 LAB
LAB AP CASE REPORT: NORMAL
PATH REPORT.FINAL DX SPEC: NORMAL
PATH REPORT.GROSS SPEC: NORMAL

## 2022-06-30 NOTE — PROGRESS NOTES
06/30/22       Tell her that pathology from the EGD showed normal duodenal biopsies with no evidence of celiac sprue.  The stomach biopsies also were normal.  The colon biopsies were normal in addition.       If she is still having GI complaints have her follow-up with me in the office and we can discuss.       Please send a copy of this report to her PCP.  Lily graham

## 2022-07-14 ENCOUNTER — TELEPHONE (OUTPATIENT)
Dept: GASTROENTEROLOGY | Facility: CLINIC | Age: 37
End: 2022-07-14

## 2022-07-14 NOTE — TELEPHONE ENCOUNTER
Called pt and advised of Dr Griffiths's note.  Verb understanding.     Results sent to NORI Leigh NP thru epic.

## 2022-07-14 NOTE — TELEPHONE ENCOUNTER
----- Message from Gigi Griffiths MD sent at 6/30/2022  4:50 PM EDT -----  06/30/22       Tell her that pathology from the EGD showed normal duodenal biopsies with no evidence of celiac sprue.  The stomach biopsies also were normal.  The colon biopsies were normal in addition.       If she is still having GI complaints have her follow-up with me in the office and we can discuss.       Please send a copy of this report to her PCP.  Lily graham

## 2022-12-08 ENCOUNTER — OFFICE VISIT (OUTPATIENT)
Dept: OBSTETRICS AND GYNECOLOGY | Facility: CLINIC | Age: 37
End: 2022-12-08

## 2022-12-08 VITALS
DIASTOLIC BLOOD PRESSURE: 80 MMHG | HEART RATE: 97 BPM | BODY MASS INDEX: 35.38 KG/M2 | SYSTOLIC BLOOD PRESSURE: 120 MMHG | HEIGHT: 57 IN | WEIGHT: 164 LBS

## 2022-12-08 DIAGNOSIS — Z01.419 ENCOUNTER FOR GYNECOLOGICAL EXAMINATION WITHOUT ABNORMAL FINDING: Primary | ICD-10-CM

## 2022-12-08 DIAGNOSIS — Z80.9 FAMILY HISTORY OF CANCER: ICD-10-CM

## 2022-12-08 PROCEDURE — G0123 SCREEN CERV/VAG THIN LAYER: HCPCS | Performed by: NURSE PRACTITIONER

## 2022-12-08 PROCEDURE — 36415 COLL VENOUS BLD VENIPUNCTURE: CPT | Performed by: NURSE PRACTITIONER

## 2022-12-08 PROCEDURE — 99395 PREV VISIT EST AGE 18-39: CPT | Performed by: NURSE PRACTITIONER

## 2022-12-08 NOTE — PROGRESS NOTES
"Well Woman Visit    CC: Scheduled annual well gyn visit  Chief Complaint   Patient presents with   • Gynecologic Exam     wwe       Myriad intake in the past?: No    DONE TODAY QUALIFIED TODAY    s/p HYST BSO, benign indications  Social History     Substance and Sexual Activity   Sexual Activity Defer   • Birth control/protection: Hysterectomy    Comment: NA       HPI:   37 y.o.No obstetric history on file.       PCP: does manage PMHx and preventative labs  History: PMHx, Meds, Allergies, PSHx, Social Hx, and POBHx all reviewed and updated.    Pt has no complaints today.    PHYSICAL EXAM:  /80   Pulse 97   Ht 144.8 cm (57.01\")   Wt 74.4 kg (164 lb)   BMI 35.48 kg/m²  Not found.  General- NAD, alert and oriented, appropriate  Psych- Normal mood, good memory  Neck- No masses, no thyroid enlargement  CV- Regular rhythm, no murnurs  Resp- CTA to bases, no wheezes  Abdomen- Soft, non distended, non tender, no masses    Breast left-  Bilaterally symmetrical, no masses, non tender, no nipple discharge  Breast right- Bilaterally symmetrical, no masses, non tender, no nipple discharge    External genitalia- Normal female, no lesions  Urethra/meatus- Normal, no masses, non tender  Bladder- Normal, no masses, non tender  Vagina- Normal, no atrophy, no lesions, no discharge.  Prolapse: none  Cvx- Absent  Uterus- Absent  Adnexa- No mass, non tender  Anus/Rectum/Perineum- Not performed    Lymphatic- No palpable neck, axillary, or groin nodes  Ext- No edema, no cyanosis    Skin- No lesions, no rashes, no acanthosis nigricans      ASSESSMENT and PLAN:    Diagnoses and all orders for this visit:    1. Encounter for gynecological examination without abnormal finding (Primary)  -     IGP,rfx Aptima HPV All Pth    2. Family history of cancer  -     [facility] He[facility]itary [facility] Screen        Preventative:  • BREAST HEALTH- Monthly self breast exam importance and how to reviewed. MMG and/or MRI (prn) reviewed per society " guidelines and her individual history. Screen: Not medically needed  • CERVICAL CANCER Screening- Reviewed current ASCCP guidelines for screening w and wo cotest HPV, age specific.  Screen: Updated today  • SEXUAL HEALTH: Declines STD screening  • VACCINATIONS Recommended: COVID and booster PRN, Flu annually, Gardisil/HPV vaccine (up to 46yo).  Importance discussed, risk being unvaccinated reviewed.  Questions answered  • Smoking status- NON SMOKER  MYRIAD: Qualifies for testing. Testing accepted.    She understands the importance of having any ordered tests to be performed in a timely fashion.  The risks of not performing them include, but are not limited to, advanced cancer stages, bone loss from osteoporosis and/or subsequent increase in morbidity and/or mortality.  She is encouraged to review her results online and/or contact or office if she has questions.     Follow Up:  Return in about 1 year (around 12/8/2023) for Annual physical.            Enrique Pizano, APRN  12/08/2022    [facility] [address]  [facility]  [address] [address]  Dept: [phone]  Loc: [phone]

## 2022-12-15 LAB
CONV .: NORMAL
CYTOLOGIST CVX/VAG CYTO: NORMAL
CYTOLOGY CVX/VAG DOC CYTO: NORMAL
CYTOLOGY CVX/VAG DOC THIN PREP: NORMAL
DX ICD CODE: NORMAL
HIV 1 & 2 AB SER-IMP: NORMAL
OTHER STN SPEC: NORMAL
STAT OF ADQ CVX/VAG CYTO-IMP: NORMAL

## 2022-12-16 ENCOUNTER — TELEPHONE (OUTPATIENT)
Dept: OBSTETRICS AND GYNECOLOGY | Facility: CLINIC | Age: 37
End: 2022-12-16

## 2022-12-16 DIAGNOSIS — B37.9 CANDIDIASIS: Primary | ICD-10-CM

## 2022-12-16 RX ORDER — FLUCONAZOLE 150 MG/1
150 TABLET ORAL ONCE
Qty: 1 TABLET | Refills: 0 | Status: SHIPPED | OUTPATIENT
Start: 2022-12-16 | End: 2022-12-16

## 2022-12-16 NOTE — TELEPHONE ENCOUNTER
----- Message from ROMMEL Berrios sent at 12/16/2022  9:54 AM EST -----  Please let patient know she has a yeast infection, will send prescription to her pharmacy.  Otherwise her pap is negative.

## 2022-12-22 ENCOUNTER — TELEPHONE (OUTPATIENT)
Dept: OBSTETRICS AND GYNECOLOGY | Facility: CLINIC | Age: 37
End: 2022-12-22

## 2022-12-22 LAB — REF LAB TEST METHOD: NORMAL

## 2022-12-22 NOTE — TELEPHONE ENCOUNTER
----- Message from ROMMEL Berrios sent at 12/22/2022  3:55 PM EST -----  Please let patient know her My Risk hereditary cancer screen is negative.  Will mail out her official copy when we receive it.

## 2023-07-07 NOTE — TELEPHONE ENCOUNTER
----- Message from Gigi Griffiths MD sent at 1/24/2022  6:32 AM EST -----  01/24/22       Tell her that the following lab tests came back normal: Her celiac sprue antibody panel was normal, also serum protein electrophoresis, antinuclear antibody, smooth muscle antibody, antimitochondrial antibody, CBC, her comprehensive metabolic profile was normal except her AST was 66, ALT is 72, and other liver function tests were normal.  Her TSH was normal.  Her alpha-1 antitrypsin level was not low (which is good), ceruloplasmin was normal, and her hepatitis profile was negative.       We will see what the CT of the abdomen and pelvis shows?       Please send a copy of this report to her PCP.  Lily graham   no itching/no dryness

## 2024-09-18 ENCOUNTER — OFFICE VISIT (OUTPATIENT)
Dept: OBSTETRICS AND GYNECOLOGY | Facility: CLINIC | Age: 39
End: 2024-09-18
Payer: COMMERCIAL

## 2024-09-18 VITALS
DIASTOLIC BLOOD PRESSURE: 80 MMHG | WEIGHT: 180 LBS | HEART RATE: 94 BPM | BODY MASS INDEX: 38.94 KG/M2 | SYSTOLIC BLOOD PRESSURE: 116 MMHG

## 2024-09-18 DIAGNOSIS — Z01.411 ENCOUNTER FOR GYNECOLOGICAL EXAMINATION WITH ABNORMAL FINDING: Primary | ICD-10-CM

## 2024-09-18 DIAGNOSIS — N64.52 NIPPLE DISCHARGE, BLOODY: ICD-10-CM

## 2024-10-21 ENCOUNTER — HOSPITAL ENCOUNTER (OUTPATIENT)
Dept: ULTRASOUND IMAGING | Facility: HOSPITAL | Age: 39
Discharge: HOME | End: 2024-10-21
Payer: COMMERCIAL

## 2024-10-21 ENCOUNTER — TELEPHONE (OUTPATIENT)
Dept: OBSTETRICS AND GYNECOLOGY | Facility: CLINIC | Age: 39
End: 2024-10-21
Payer: COMMERCIAL

## 2024-10-21 ENCOUNTER — HOSPITAL ENCOUNTER (OUTPATIENT)
Dept: MAMMOGRAPHY | Facility: HOSPITAL | Age: 39
Discharge: HOME | End: 2024-10-21
Payer: COMMERCIAL

## 2024-10-21 DIAGNOSIS — N64.52 NIPPLE DISCHARGE, BLOODY: Primary | ICD-10-CM

## 2024-10-21 DIAGNOSIS — N64.52 NIPPLE DISCHARGE, BLOODY: ICD-10-CM

## 2024-10-21 PROCEDURE — G0279 TOMOSYNTHESIS, MAMMO: HCPCS

## 2024-10-21 PROCEDURE — 77066 DX MAMMO INCL CAD BI: CPT

## 2024-10-21 PROCEDURE — 76642 ULTRASOUND BREAST LIMITED: CPT

## 2025-01-14 ENCOUNTER — HOSPITAL ENCOUNTER (OUTPATIENT)
Dept: MRI IMAGING | Facility: HOSPITAL | Age: 40
Discharge: HOME | End: 2025-01-14
Admitting: NURSE PRACTITIONER
Payer: COMMERCIAL

## 2025-01-14 ENCOUNTER — TELEPHONE (OUTPATIENT)
Dept: OBSTETRICS AND GYNECOLOGY | Facility: CLINIC | Age: 40
End: 2025-01-14
Payer: COMMERCIAL

## 2025-01-14 DIAGNOSIS — N64.52 NIPPLE DISCHARGE, BLOODY: Primary | ICD-10-CM

## 2025-01-14 DIAGNOSIS — N64.52 NIPPLE DISCHARGE, BLOODY: ICD-10-CM

## 2025-01-14 PROCEDURE — A9577 INJ MULTIHANCE: HCPCS | Performed by: NURSE PRACTITIONER

## 2025-01-14 PROCEDURE — 25510000002 GADOBENATE DIMEGLUMINE 529 MG/ML SOLUTION: Performed by: NURSE PRACTITIONER

## 2025-01-14 PROCEDURE — 77049 MRI BREAST C-+ W/CAD BI: CPT

## 2025-01-14 RX ADMIN — GADOBENATE DIMEGLUMINE 20 ML: 529 INJECTION, SOLUTION INTRAVENOUS at 11:34

## 2025-01-14 NOTE — TELEPHONE ENCOUNTER
----- Message from Enrique Pizano sent at 1/14/2025  4:09 PM EST -----  Please let patient know her breast MRI shows no evidence of malignancy.  Recommend consult with Dr. Campos for bloody nipple discharge.

## 2025-01-14 NOTE — TELEPHONE ENCOUNTER
Results and recommendations given to patient. Patient is to call office back if does not hear from  office for an appointment by next Friday.

## 2025-03-26 ENCOUNTER — TELEPHONE (OUTPATIENT)
Dept: PLASTIC SURGERY | Facility: CLINIC | Age: 40
End: 2025-03-26

## 2025-03-26 NOTE — TELEPHONE ENCOUNTER
I left message for pt to call and schedule. OK for HUB to schedule first available with Dr Gordon.

## 2025-05-05 NOTE — PROGRESS NOTES
Consult (Left nipple drainage and blood)            Initial evaluation  Delmi Phillips is a 39 y.o. female who presents to [facility] as a consult from ROMMEL Berrios to discuss left nipple bloody discharge. Has been present for 8 months. Did have BRACA gene testing which came back negative.  She wears 40H bra size. Is having shoulder and neck pain. Has no kids. Non smoker. 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  No family history of breast cancer.    Mammo/us 10/21/24  MRI Breast 1/14/25        History of Present Illness  The patient presents for evaluation of breast discharge.    She reports experiencing spontaneous nipple discharge, which she initially discovered while examining her chapped nipples. The discharge is predominantly brown, occasionally yellow-green, and rarely red. It is not milk-like in consistency. She does not monitor the frequency of the discharge but notes that it occurs almost daily, often staining her clothing. The frequency of the discharge has decreased, but she recently observed a resurgence. She expresses frustration with the persistent bleeding and staining, which she finds bothersome. She also reports shoulder grooving and difficulty exercising due to her breast size. She does not wear a bra to bed, opting for a tank top instead. She has observed skin discoloration under her breasts, which she attributes to her PCOS. She has no history of smoking, alcohol consumption, or drug use. She resides with her parents and provides care for them. She is employed as a childcare provider and acknowledges the need for weight reduction. She underwent a mammogram and ultrasound in 10/2024, both of which were unremarkable. An MRI with contrast was performed approximately 2.5 to 3 months later, also yielding negative results.    She has a duplex of the carotid scheduled on 05/28/2025 because her doctor wanted her to get  "checked out since her arms had 2 different blood pressure readings. Her mother had a stroke almost 3 years ago and her father had to have open heart surgery.    Supplemental Information  She had a total hysterectomy in 2018.    SOCIAL HISTORY  She does not smoke, drink alcohol, or use drugs.    FAMILY HISTORY  Her mother had a stroke almost 3 years ago.  Her father had to have open heart surgery.        Subjective      Patient has no known allergies.  Allergies Reconciled.    Review of Systems  All system were reviewed and were negative, except the ones noted above.     @Objective     /77 (BP Location: Left arm, Patient Position: Sitting, Cuff Size: Large Adult)   Pulse 80   Ht 144.8 cm (57.01\")   Wt 86.2 kg (190 lb)   SpO2 98%   BMI 41.10 kg/m²     Body mass index is 41.1 kg/m².  Class 3 Severe Obesity (BMI >=40). Obesity-related health conditions include the following: none. Obesity is unchanged. BMI is is above average; no BMI management plan is appropriate. We discussed portion control and increasing exercise.        Physical Exam        Physical exam:  Patient awake, alert, oriented  Respirations are non elaborated  Patient is not tachycardic    Breast exam:     Breast measurements: Sternal notch to the right nipple is 34 cm, to the left is 35 cm. Inframammary fold to the right breast is 14 cm, to the left is 15 cm. Right base is 25 cm, left base is 25 cm.      Result Review :              Assessment and Plan        Scribed by Heather Gordon MD, acting as a scribe for Heather Gordon MD, 05/21/25 12:29 EDT.  Heather Gordon MD's signature on the note affirms that the note adequately documents the care provided.     Specific to this patient:  Assessment & Plan  1. Nipple discharge.  The patient reports experiencing nipple discharge with varying colors, including brown, yellowy-green, and occasionally reddish. Previous imaging, including a mammogram, ultrasound, and MRI with " contrast, showed no abnormalities. The differential diagnosis includes benign conditions such as papilloma or malignant conditions that are not visible on imaging. A bilateral breast reduction with amputation and free nipple graft is recommended. Approximately 1000 g or more will be removed from each breast. The procedure will involve removing the ducts to eliminate the discharge, and the nipples will be grafted as skin grafts. Postoperative care includes managing the skin grafts, which may become dark and peel before regaining color. The patient will need to avoid smoking and exposure to smoke to ensure proper healing. The healing process is expected to take about 6 weeks, with some potential for healing complications due to the large reduction. Drains will be placed postoperatively, and the patient will need to avoid lifting heavy objects for at least 4 weeks.    2. Carotid artery evaluation.  The patient has a duplex of the carotid artery scheduled for 05/28/2025 due to concerns about differing blood pressure readings in her arms and a family history of stroke and heart disease. The vascular specialist did not express immediate concern but recommended further evaluation.    PROCEDURE  The patient underwent a total hysterectomy in 2018.      Consent: bilateral breast reduction with amputation and free nipple graft 2 hrs   [id number]- breast reduction    The patient was given literature in regards to the procedure and encouraged to visit the websites of ASPS and ASAPS.  Pictures obtained.  We will have the patient obtain pre-operative clearance.  We discussed the procedure for bilateral breast reduction under general anesthesia as well as the postoperative recover time and the need for limitation of activities.  The risks of surgery were discussed including bleeding, infection, scarring (for which diagrams were drawn), pain, poor healing, loss of skin and or nipple, change in nipple sensation, inability to breast  feed, asymmetry, no guarantee of post-operative cup size.    I think that this patient is an excellent candidate for a breast reduction.  If feel that this procedure is medically necessary to relieve her symptoms.  We will contact the insurance company for pre-authorization.    This patient has my office number to call with any further questions.     Follow Up     No follow-ups on file.    Patient was given instructions and counseling regarding her condition. Please see specific information pulled into the AVS if appropriate.     Heather Gordon MD  05/14/2025    Patient or patient representative verbalized consent for the use of Ambient Listening during the visit with  Heather Gordon MD for chart documentation. 5/21/2025  12:35 EDT

## 2025-05-09 ENCOUNTER — PATIENT ROUNDING (BHMG ONLY) (OUTPATIENT)
Age: 40
End: 2025-05-09

## 2025-05-09 ENCOUNTER — OFFICE VISIT (OUTPATIENT)
Age: 40
End: 2025-05-09
Payer: MEDICAID

## 2025-05-09 VITALS
HEIGHT: 57 IN | BODY MASS INDEX: 40.56 KG/M2 | DIASTOLIC BLOOD PRESSURE: 88 MMHG | HEART RATE: 81 BPM | TEMPERATURE: 98.8 F | SYSTOLIC BLOOD PRESSURE: 133 MMHG | OXYGEN SATURATION: 96 % | RESPIRATION RATE: 18 BRPM | WEIGHT: 188 LBS

## 2025-05-09 DIAGNOSIS — I65.23 BILATERAL CAROTID ARTERY STENOSIS: Primary | ICD-10-CM

## 2025-05-09 DIAGNOSIS — R09.89 UNEQUAL BLOOD PRESSURE IN UPPER EXTREMITIES: ICD-10-CM

## 2025-05-09 NOTE — PROGRESS NOTES
[facility] New Patient Office Note     Date of Encounter: 05/09/2025     MRN Number: [id number]  Name: Delmi Phillips  Phone Number: [phone]     Referred By: Leida Leigh APRN  PCP: Leida Leigh APRN    Chief Complaint:    Chief Complaint   Patient presents with    Extremity Pain     Patient is a 39 year old female that presents to the clinic from her primary care provider. Primary care is concerned in re: a clinical difference in blood pressures. Patient has no pain in either arm. Patient states this condition Is familial. Patient has no other symptoms.        Subjective      History of Present Illness:    Delmi Phillips is a 39 y.o. female presents as a referral from ROMMEL Robb for variation in upper extremity blood pressures from left-to-right. She had about a 30 mm/hg difference at her primarys office. The patient states she checks regularly at home and it is not always different just occasionally.  She denies any signs/symptoms to suggest CVA, TIA or amaurosis fugax. no personal history of stroke however her mom has had a stroke and her dad has had severe cardiac disease.  She is not a smoker.    Review of Systems:  ROS  Review of Systems   Constitutional: Negative.   HENT: Negative.    Cardiovascular: bp differential and numbness right arm.    Respiratory: Negative.    Skin: Negative.    Musculoskeletal: Negative.    Gastrointestinal: Negative.    Neurological: Negative.    Psychiatric/Behavioral: Negative.     I have reviewed the following portions of the patient's history: problem list, current medications, allergies, past surgical history, past medical history, past social history, past family history, and ROS and confirm it's accurate.    Allergies:  No Known Allergies    Medications:      Current Outpatient Medications:     ascorbic acid (VITAMIN C) 1000 MG tablet, Take 4 tablets by mouth Daily., Disp: , Rfl:     Auvi-Q 0.3 MG/0.3ML solution auto-injector  injection, INJECT AS NEEDED FOR SEVERE ALLERGIC REACTION INCLUDING ANAPHYLAXIS AS DIRECTED, Disp: , Rfl:     estradiol (ESTRACE) 2 MG tablet, Take 1 tablet by mouth Daily., Disp: , Rfl:     ipratropium-albuterol (DUO-NEB) 0.5-2.5 mg/3 ml nebulizer, , Disp: , Rfl:     lisinopril (PRINIVIL,ZESTRIL) 10 MG tablet, Take 1 tablet by mouth Daily. (Patient taking differently: Take 2 tablets by mouth Daily.), Disp: , Rfl:     montelukast (SINGULAIR) 10 MG tablet, Take 1 tablet by mouth Every Night., Disp: , Rfl:     multivitamin with minerals tablet tablet, Take 1 tablet by mouth Daily., Disp: , Rfl:     omeprazole (priLOSEC) 40 MG capsule, Take 1 capsule by mouth Daily., Disp: , Rfl:     fluticasone (FLONASE) 50 MCG/ACT nasal spray, 2 sprays into the nostril(s) as directed by provider Daily for 31 days., Disp: 15.8 mL, Rfl: 0    promethazine-dextromethorphan (PROMETHAZINE-DM) 6.25-15 MG/5ML syrup, Take 5 mL by mouth 4 (Four) Times a Day As Needed for Cough., Disp: 120 mL, Rfl: 0    History:   Past Medical History:   Diagnosis Date    Allergies     Chronic Caitlin Barr virus (EBV) infection     Diabetes mellitus     Endometriosis August 28 2018    Found at hysterectomy    Frequent headaches     GERD (gastroesophageal reflux disease)     History of COVID-19 2020    History of mononucleosis     History of pneumonia 03/2020    Hypertension     Kidney stones     Migraine Most of life    Nausea     OAB (overactive bladder)     PCOS (polycystic ovarian syndrome)     Polycystic ovary syndrome Early 2000s    PONV (postoperative nausea and vomiting)     Post-COVID chronic dyspnea     AT TIMES, VERY SPORADIC NOW    Urinary tract infection Couple in past years    Varicella As a kid       Past Surgical History:   Procedure Laterality Date    CHOLECYSTECTOMY      COLONOSCOPY N/A 06/23/2022    Procedure: COLONOSCOPY to terminal ileum with biopsies (cold bx);  Surgeon: Gigi Griffiths MD;  Location: [facility] END[facility]COPY;  Service:  "Gastroenterology;  Laterality: N/A;  pre - abdominal pain, alternating diarrhea/constipation  post - hemorrhoids    ENDOSCOPY N/A 06/23/2022    Procedure: ESOPHAGOGASTRODUODENOSCOPY with biopsies (cold bx);  Surgeon: Gigi Griffiths MD;  Location: [facility] ENDOSCOPY;  Service: Gastroenterology;  Laterality: N/A;  pre - nausea, abdominal pain  post - gastritis    FOOT SURGERY      HYSTERECTOMY      LAPAROSCOPIC ASSISTED VAGINAL HYSTERECTOMY SALPINGO OOPHORECTOMY  8/28/18    LAPAROSCOPIC CHOLECYSTECTOMY  2007    UPPER GASTROINTESTINAL ENDOSCOPY  2011       Social History     Socioeconomic History    Marital status:    Tobacco Use    Smoking status: Never    Smokeless tobacco: Never   Vaping Use    Vaping status: Never Used   Substance and Sexual Activity    Alcohol use: Never    Drug use: Never    Sexual activity: Never     Birth control/protection: Hysterectomy     Comment: NA        Family History   Problem Relation Age of Onset    Diabetes Father     Hypertension Father     Heart attack Father     Hypertension Mother     Pancreatic cancer Paternal Grandmother     Ovarian cancer Maternal Grandmother     Colon cancer Maternal Grandmother     Malig Hyperthermia Neg Hx        Objective     Physical Exam:  Vitals:    05/09/25 1302   BP: 133/88   BP Location: Right arm   Patient Position: Sitting   Cuff Size: Large Adult   Pulse: 81   Resp: 18   Temp: 98.8 °F (37.1 °C)   TempSrc: Oral   SpO2: 96%   Weight: 85.3 kg (188 lb)   Height: 144.8 cm (57\")   PainSc: 0-No pain      Body mass index is 40.68 kg/m².  Class 3 Severe Obesity (BMI >=40). Obesity-related health conditions include the following:    . Obesity is improving with lifestyle modifications. BMI is is above average; BMI management plan is completed. We discussed portion control, increasing exercise, and Information on healthy weight added to patient's after visit summary.       Physical Exam   Physical Exam  Constitutional:       Appearance: Normal  HENT:     "  Head: Normocephalic and atraumatic.   Eyes:      Pupils: Pupils are equal, round, and reactive to light.   Neck:      Comments:   Cardiovascular:      Rate and Rhythm: Normal rate and regular rhythm.   Pulmonary:      Effort: Pulmonary effort is normal.     Abdominal:      Palpations: Abdomen is soft.   Musculoskeletal:      Cervical back: Normal range of motion and neck supple.   Skin:     General: Skin is warm and dry.   Neurological:      General: No focal deficit present.      Mental Status: Alert and oriented to person, place, and time.     Imaging/Labs:    No radiology results for the last 30 days.       Assessment / Plan      Assessment / Plan:  Diagnoses and all orders for this visit:    1. Bilateral carotid artery stenosis (Primary)  -     Duplex Carotid Ultrasound CAR; Future    2. Unequal blood pressure in upper extremities  -     Duplex Carotid Ultrasound CAR; Future    Ms Phillips has been having blood pressure readings that have been decreased in the left arm when compared to the right, last reading at her primary care was 105/89 on the left, 131/97 on the right.  The blood pressures were not unequal in the office today, performed by 2 different staff members. She also states she has numbness in the right arm at times. She was also concerned with leg discomfort after standing for long periods of time however she has excellent pedal pulses and denies any claudication.  I recommend that we obtain a carotid duplex and follow-up in the office to further evaluate.    I have answered all of her questions and she is in agreement with the plan at this time.  Thank you for allowing me to participate in your patient's care.    Patient Education: We discussed symptoms, testing and interventions including her blood pressure readings .  The second set of BP were at 123/78 left and 125/75 on the right.     Follow Up:   No follow-ups on file.   Or sooner for any further concerns or worsening sign and symptoms. If  unable to reach us in the office please dial 911 or go to the nearest emergency department.      ROMMEL Altamirano  [facility]

## 2025-05-14 ENCOUNTER — OFFICE VISIT (OUTPATIENT)
Dept: PLASTIC SURGERY | Facility: CLINIC | Age: 40
End: 2025-05-14
Payer: MEDICAID

## 2025-05-14 VITALS
HEIGHT: 57 IN | OXYGEN SATURATION: 98 % | HEART RATE: 80 BPM | SYSTOLIC BLOOD PRESSURE: 110 MMHG | BODY MASS INDEX: 40.99 KG/M2 | DIASTOLIC BLOOD PRESSURE: 77 MMHG | WEIGHT: 190 LBS

## 2025-05-14 DIAGNOSIS — N64.52 NIPPLE DISCHARGE, BLOODY: Primary | ICD-10-CM

## 2025-05-14 RX ORDER — KETOROLAC TROMETHAMINE 10 MG/1
TABLET, FILM COATED ORAL
COMMUNITY
Start: 2025-04-21

## 2025-05-14 RX ORDER — RIZATRIPTAN BENZOATE 10 MG/1
TABLET ORAL
COMMUNITY
Start: 2025-04-30

## 2025-05-14 RX ORDER — ONDANSETRON 4 MG/1
TABLET, ORALLY DISINTEGRATING ORAL
COMMUNITY
Start: 2025-04-21

## 2025-05-16 ENCOUNTER — PATIENT MESSAGE (OUTPATIENT)
Dept: PLASTIC SURGERY | Facility: CLINIC | Age: 40
End: 2025-05-16
Payer: MEDICAID

## 2025-05-16 ENCOUNTER — PATIENT ROUNDING (BHMG ONLY) (OUTPATIENT)
Dept: PLASTIC SURGERY | Facility: CLINIC | Age: 40
End: 2025-05-16
Payer: MEDICAID

## 2025-05-16 NOTE — PROGRESS NOTES
A [employer] message has been sent to the patient for PATIENT ROUNDING with [facility].

## 2025-05-21 ENCOUNTER — PREP FOR SURGERY (OUTPATIENT)
Dept: OTHER | Facility: HOSPITAL | Age: 40
End: 2025-05-21
Payer: MEDICAID

## 2025-05-21 DIAGNOSIS — N64.52 NIPPLE DISCHARGE, BLOODY: ICD-10-CM

## 2025-05-21 DIAGNOSIS — N62 MACROMASTIA: Primary | ICD-10-CM

## 2025-05-21 RX ORDER — ACETAMINOPHEN 500 MG
1000 TABLET ORAL ONCE
OUTPATIENT
Start: 2025-05-21 | End: 2025-05-21

## 2025-05-21 RX ORDER — TRANEXAMIC ACID 10 MG/ML
1000 INJECTION, SOLUTION INTRAVENOUS
OUTPATIENT
Start: 2025-05-21

## 2025-05-21 RX ORDER — SCOPOLAMINE 1 MG/3D
1 PATCH, EXTENDED RELEASE TRANSDERMAL CONTINUOUS
OUTPATIENT
Start: 2025-05-21 | End: 2025-05-24

## 2025-05-28 ENCOUNTER — HOSPITAL ENCOUNTER (OUTPATIENT)
Dept: CARDIOLOGY | Facility: HOSPITAL | Age: 40
Discharge: HOME | End: 2025-05-28
Admitting: NURSE PRACTITIONER
Payer: MEDICAID

## 2025-05-28 ENCOUNTER — OFFICE VISIT (OUTPATIENT)
Age: 40
End: 2025-05-28
Payer: MEDICAID

## 2025-05-28 VITALS
RESPIRATION RATE: 20 BRPM | SYSTOLIC BLOOD PRESSURE: 147 MMHG | WEIGHT: 190 LBS | HEIGHT: 57 IN | BODY MASS INDEX: 40.99 KG/M2 | DIASTOLIC BLOOD PRESSURE: 93 MMHG | OXYGEN SATURATION: 99 % | HEART RATE: 82 BPM

## 2025-05-28 DIAGNOSIS — I65.23 BILATERAL CAROTID ARTERY STENOSIS: ICD-10-CM

## 2025-05-28 DIAGNOSIS — R09.89 UNEQUAL BLOOD PRESSURE IN UPPER EXTREMITIES: ICD-10-CM

## 2025-05-28 DIAGNOSIS — R09.89 UNEQUAL BLOOD PRESSURE IN UPPER EXTREMITIES: Primary | ICD-10-CM

## 2025-05-28 LAB
BH CV XLRA MEAS LEFT CAROTID BULB EDV: 22.1 CM/SEC
BH CV XLRA MEAS LEFT CAROTID BULB PSV: 62.8 CM/SEC
BH CV XLRA MEAS LEFT DIST CCA EDV: 25.8 CM/SEC
BH CV XLRA MEAS LEFT DIST CCA PSV: 77.1 CM/SEC
BH CV XLRA MEAS LEFT DIST ICA EDV: 33.2 CM/SEC
BH CV XLRA MEAS LEFT DIST ICA PSV: 83.1 CM/SEC
BH CV XLRA MEAS LEFT ICA/CCA RATIO: 0.81
BH CV XLRA MEAS LEFT MID ICA EDV: 33.9 CM/SEC
BH CV XLRA MEAS LEFT MID ICA PSV: 78.8 CM/SEC
BH CV XLRA MEAS LEFT PROX CCA EDV: 27.4 CM/SEC
BH CV XLRA MEAS LEFT PROX CCA PSV: 84.5 CM/SEC
BH CV XLRA MEAS LEFT PROX ECA EDV: 18.6 CM/SEC
BH CV XLRA MEAS LEFT PROX ECA PSV: 109.2 CM/SEC
BH CV XLRA MEAS LEFT PROX ICA EDV: 29 CM/SEC
BH CV XLRA MEAS LEFT PROX ICA PSV: 62.8 CM/SEC
BH CV XLRA MEAS LEFT VERTEBRAL A EDV: 16.3 CM/SEC
BH CV XLRA MEAS LEFT VERTEBRAL A PSV: 48.4 CM/SEC
BH CV XLRA MEAS RIGHT CAROTID BULB EDV: 25.1 CM/SEC
BH CV XLRA MEAS RIGHT CAROTID BULB PSV: 59.1 CM/SEC
BH CV XLRA MEAS RIGHT DIST CCA EDV: 29.1 CM/SEC
BH CV XLRA MEAS RIGHT DIST CCA PSV: 81.1 CM/SEC
BH CV XLRA MEAS RIGHT DIST ICA EDV: 31.6 CM/SEC
BH CV XLRA MEAS RIGHT DIST ICA PSV: 73.6 CM/SEC
BH CV XLRA MEAS RIGHT ICA/CCA RATIO: 0.89
BH CV XLRA MEAS RIGHT MID ICA EDV: 28.6 CM/SEC
BH CV XLRA MEAS RIGHT MID ICA PSV: 64.4 CM/SEC
BH CV XLRA MEAS RIGHT PROX CCA EDV: 21.2 CM/SEC
BH CV XLRA MEAS RIGHT PROX CCA PSV: 62.4 CM/SEC
BH CV XLRA MEAS RIGHT PROX ECA EDV: 17.5 CM/SEC
BH CV XLRA MEAS RIGHT PROX ECA PSV: 128.5 CM/SEC
BH CV XLRA MEAS RIGHT PROX ICA EDV: 23.8 CM/SEC
BH CV XLRA MEAS RIGHT PROX ICA PSV: 71.9 CM/SEC
BH CV XLRA MEAS RIGHT VERTEBRAL A EDV: 7 CM/SEC
BH CV XLRA MEAS RIGHT VERTEBRAL A PSV: 36.7 CM/SEC
LEFT ARM BP: NORMAL MMHG
RIGHT ARM BP: NORMAL MMHG

## 2025-05-28 PROCEDURE — 93880 EXTRACRANIAL BILAT STUDY: CPT

## 2025-05-28 RX ORDER — BETAMETHASONE DIPROPIONATE 0.5 MG/G
CREAM TOPICAL
COMMUNITY
Start: 2025-05-25

## 2025-05-28 RX ORDER — MUPIROCIN 20 MG/G
OINTMENT TOPICAL
COMMUNITY
Start: 2025-05-25

## 2025-05-28 NOTE — PROGRESS NOTES
[facility] Follow Up Note     Date of Encounter: 05/28/2025     MRN Number: [id number]  Name: Delmi Phillips  Phone Number: [phone]     Referred By: No ref. provider found  PCP: Leida Leigh APRN    Chief Complaint:    Chief Complaint   Patient presents with    Follow-up       Subjective      History of Present Illness:    Delmi Phillips is a 39 y.o. female presents for follow-up with a carotid duplex performed today.  She was referred for unequal blood pressures in the upper extremities and a strong family history of stroke and heart disease.  The differential pressures were not replicated at the time of her visit.  She denies any current signs/symptoms to suggest CVA, TIA or amaurosis fugax.  She is not a smoker    Review of Systems:  ROS  Review of Systems   Constitutional: Negative.   HENT: Negative.    Cardiovascular: Negative.    Respiratory: Negative.    Skin: Negative.    Musculoskeletal: Negative.    Gastrointestinal: Negative.    Neurological: Negative.    Psychiatric/Behavioral: Negative.      I have reviewed the following portions of the patient's history: problem list, current medications, allergies, past surgical history, past medical history, past social history, past family history, and ROS and confirm it's accurate.    Allergies:  No Known Allergies    Medications:      Current Outpatient Medications:     ascorbic acid (VITAMIN C) 1000 MG tablet, Take 4 tablets by mouth Daily., Disp: , Rfl:     Auvi-Q 0.3 MG/0.3ML solution auto-injector injection, INJECT AS NEEDED FOR SEVERE ALLERGIC REACTION INCLUDING ANAPHYLAXIS AS DIRECTED, Disp: , Rfl:     betamethasone dipropionate (DIPROSONE) 0.05 % cream, , Disp: , Rfl:     estradiol (ESTRACE) 2 MG tablet, Take 1 tablet by mouth Daily., Disp: , Rfl:     ipratropium-albuterol (DUO-NEB) 0.5-2.5 mg/3 ml nebulizer, , Disp: , Rfl:     lisinopril (PRINIVIL,ZESTRIL) 10 MG tablet, Take 1 tablet by mouth Daily. (Patient taking  differently: Take 2 tablets by mouth Daily.), Disp: , Rfl:     montelukast (SINGULAIR) 10 MG tablet, Take 1 tablet by mouth Every Night., Disp: , Rfl:     multivitamin with minerals tablet tablet, Take 1 tablet by mouth Daily., Disp: , Rfl:     mupirocin (BACTROBAN) 2 % ointment, , Disp: , Rfl:     omeprazole (priLOSEC) 40 MG capsule, Take 1 capsule by mouth Daily., Disp: , Rfl:     ondansetron ODT (ZOFRAN-ODT) 4 MG disintegrating tablet, , Disp: , Rfl:     rizatriptan (MAXALT) 10 MG tablet, , Disp: , Rfl:     fluticasone (FLONASE) 50 MCG/ACT nasal spray, 2 sprays into the nostril(s) as directed by provider Daily for 31 days., Disp: 15.8 mL, Rfl: 0    ketorolac (TORADOL) 10 MG tablet, , Disp: , Rfl:     History:   Past Medical History:   Diagnosis Date    Allergies     Chronic Caitlin Barr virus (EBV) infection     Diabetes mellitus     Endometriosis August 28 2018    Found at hysterectomy    Frequent headaches     GERD (gastroesophageal reflux disease)     History of COVID-19 2020    History of mononucleosis     History of pneumonia 03/2020    Hypertension     Kidney stones     Migraine Most of life    Nausea     OAB (overactive bladder)     PCOS (polycystic ovarian syndrome)     Polycystic ovary syndrome Early 2000s    PONV (postoperative nausea and vomiting)     Post-COVID chronic dyspnea     AT TIMES, VERY SPORADIC NOW    Urinary tract infection Couple in past years    Varicella As a kid       Past Surgical History:   Procedure Laterality Date    CHOLECYSTECTOMY      COLONOSCOPY N/A 06/23/2022    Procedure: COLONOSCOPY to terminal ileum with biopsies (cold bx);  Surgeon: Gigi Griffiths MD;  Location: [facility];  Service: Gastroenterology;  Laterality: N/A;  pre - abdominal pain, alternating diarrhea/constipation  post - hemorrhoids    ENDOSCOPY N/A 06/23/2022    Procedure: ESOPHAGOGASTRODUODENOSCOPY with biopsies (cold bx);  Surgeon: Gigi Griffiths MD;  Location: [facility] END[facility];  Service:  "Gastroenterology;  Laterality: N/A;  pre - nausea, abdominal pain  post - gastritis    FOOT SURGERY      HYSTERECTOMY      LAPAROSCOPIC ASSISTED VAGINAL HYSTERECTOMY SALPINGO OOPHORECTOMY  8/28/18    LAPAROSCOPIC CHOLECYSTECTOMY  2007    UPPER GASTROINTESTINAL ENDOSCOPY  2011       Social History     Socioeconomic History    Marital status:    Tobacco Use    Smoking status: Never     Passive exposure: Never    Smokeless tobacco: Never   Vaping Use    Vaping status: Never Used   Substance and Sexual Activity    Alcohol use: Never    Drug use: Never    Sexual activity: Never     Birth control/protection: Hysterectomy     Comment: NA        Family History   Problem Relation Age of Onset    Diabetes Father     Hypertension Father     Heart attack Father     Hypertension Mother     Asthma Mother     Stroke Mother     Pancreatic cancer Paternal Grandmother     Ovarian cancer Maternal Grandmother     Colon cancer Maternal Grandmother     Malig Hyperthermia Neg Hx        Objective     Physical Exam:  Vitals:    05/28/25 1400 05/28/25 1404   BP: 147/96  Comment: left arm 147/93  Comment: right arm   Pulse: 82    Resp: 20    SpO2: 99%    Weight: 86.2 kg (190 lb)    Height: 144.8 cm (57.01\")       Body mass index is 41.1 kg/m².        Physical Exam  Physical Exam  Constitutional:       Appearance:Normal.   HENT:      Head: Normocephalic and atraumatic.   Eyes:      Extraocular Movements: Extraocular movements intact.      Pupils: Pupils are equal, round, and reactive to light.   Cardiovascular:      Rate and Rhythm: Normal rate and regular rhythm.   Pulmonary:      Effort: Pulmonary effort is normal.   Musculoskeletal:      Cervical back: Normal range of motion and neck supple.   Skin:     General: Skin is warm and dry.   Neurological:      General: No focal deficit present.      Mental Status: Alert and oriented to person, place, and time.     Imaging/Labs:  I have reviewed the preliminary results of the carotid " duplex performed today.  The duplex performed today reveals no evidence of carotid artery stenosis and no blood pressures differentials.    Assessment / Plan      Assessment / Plan:  Diagnoses and all orders for this visit:    1. Unequal blood pressure in upper extremities (Primary)    Ms. Phillips had reported differential and blood pressures of the upper extremities on more than one occasion however we were unable to replicate those readings in the office or on the duplex performed today.  The carotid duplex reveals no evidence of carotid artery stenosis.  No vascular intervention recommended at this time, she may follow-up as needed.    I have answered all of her questions and she is in agreement with the plan at this time.  Thank you for allowing me to participate in your patient's care.        Follow Up:   No follow-ups on file.   Or sooner for any further concerns or worsening sign and symptoms. If unable to reach us in the office please dial 911 or go to the nearest emergency department.      Gustavo Ortega APRHOSEA  [facility]

## 2025-07-07 ENCOUNTER — LAB (OUTPATIENT)
Dept: LAB | Facility: HOSPITAL | Age: 40
End: 2025-07-07
Payer: MEDICAID

## 2025-07-07 DIAGNOSIS — N62 MACROMASTIA: ICD-10-CM

## 2025-07-07 DIAGNOSIS — N64.52 NIPPLE DISCHARGE, BLOODY: ICD-10-CM

## 2025-07-07 LAB — COTININE UR-MCNC: NEGATIVE NG/ML

## 2025-07-07 PROCEDURE — G0480 DRUG TEST DEF 1-7 CLASSES: HCPCS

## 2025-07-07 NOTE — PROGRESS NOTES
"Follow-up (PRE OP)            History of Present Illness  Delmi Phillips is a 39 y.o. female who presents to [facility] for Pre-Operative Examination for Breast Reduction on 8/8/25      History of Present Illness       Pt presents today for pre op.     7/7/25 urine nicotine -normal. Patient states anesthesia makes her really sick and will need something prior to surgery. She is aware to let the nurses know day of surgery.      Subjective        Patient has no known allergies.  Allergies Reconciled.    Review of Systems   All review of system has been reviewed and it  is negative except the ones note above.     Objective     /72 (BP Location: Left arm, Patient Position: Sitting, Cuff Size: Adult)   Pulse 70   Ht 144.8 cm (57.01\")   Wt 85.7 kg (189 lb)   SpO2 98%   BMI 40.89 kg/m²     Body mass index is 40.89 kg/m².           Physical Exam    Large ptotic breasts. No palpable abnormalities.     Result Review :         Assessment and Plan      Diagnoses and all orders for this visit:    1. Pre-operative examination (Primary)  -     cephalexin (KEFLEX) 500 MG capsule; Take 1 capsule by mouth 4 (Four) Times a Day for 5 days.  Dispense: 20 capsule; Refill: 0  -     Chlorhexidine Gluconate 4 % solution; Apply 1 Application topically to the appropriate area as directed Daily. Shower daily for 7 days prior to surgery  Dispense: 473 mL; Refill: 0  -     naproxen (NAPROSYN) 250 MG tablet; Take 1 tablet by mouth 2 (Two) Times a Day.  Dispense: 12 tablet; Refill: 0  -     gabapentin (Neurontin) 300 MG capsule; Take 1 capsule by mouth 3 (Three) Times a Day for 18 doses.  Dispense: 18 capsule; Refill: 0  -     acetaminophen (TYLENOL) 500 MG tablet; Take 2 tablets by mouth Every 6 (Six) Hours As Needed for Moderate Pain for up to 18 doses.  Dispense: 18 tablet; Refill: 0  -     ondansetron (Zofran) 4 MG tablet; Take 1 tablet by mouth Daily As Needed for Nausea or Vomiting " for up to 18 doses.  Dispense: 18 tablet; Refill: 0    2. Macromastia        Assessment & Plan       Patient was given instructions and counseling regarding her condition. Please see specific information pulled into the AVS if appropriate.       Follow Up     No follow-ups on file.      Heather Gordon MD  07/17/2025      Patient or patient representative verbalized consent for the use of Ambient Listening during the visit with  Heather Gordon MD for chart documentation. 7/17/2025  11:57 EDT

## 2025-07-17 ENCOUNTER — OFFICE VISIT (OUTPATIENT)
Dept: PLASTIC SURGERY | Facility: CLINIC | Age: 40
End: 2025-07-17
Payer: MEDICAID

## 2025-07-17 VITALS
BODY MASS INDEX: 40.78 KG/M2 | HEART RATE: 70 BPM | OXYGEN SATURATION: 98 % | HEIGHT: 57 IN | SYSTOLIC BLOOD PRESSURE: 117 MMHG | DIASTOLIC BLOOD PRESSURE: 72 MMHG | WEIGHT: 189 LBS

## 2025-07-17 DIAGNOSIS — Z01.818 PRE-OPERATIVE EXAMINATION: Primary | ICD-10-CM

## 2025-07-17 DIAGNOSIS — N62 MACROMASTIA: ICD-10-CM

## 2025-07-17 PROCEDURE — 99024 POSTOP FOLLOW-UP VISIT: CPT | Performed by: SURGERY

## 2025-07-17 RX ORDER — ESTRADIOL 1 MG/1
1 TABLET ORAL DAILY
COMMUNITY

## 2025-07-17 RX ORDER — ONDANSETRON 4 MG/1
4 TABLET, FILM COATED ORAL DAILY PRN
Qty: 18 TABLET | Refills: 0 | Status: SHIPPED | OUTPATIENT
Start: 2025-07-17

## 2025-07-17 RX ORDER — ESTRADIOL 2 MG/1
2 TABLET ORAL DAILY
COMMUNITY

## 2025-07-17 RX ORDER — CEPHALEXIN 500 MG/1
500 CAPSULE ORAL 4 TIMES DAILY
Qty: 20 CAPSULE | Refills: 0 | Status: SHIPPED | OUTPATIENT
Start: 2025-07-17 | End: 2025-07-22

## 2025-07-17 RX ORDER — GABAPENTIN 300 MG/1
300 CAPSULE ORAL 3 TIMES DAILY
Qty: 18 CAPSULE | Refills: 0 | Status: SHIPPED | OUTPATIENT
Start: 2025-07-17 | End: 2025-07-23

## 2025-07-17 RX ORDER — CHLORHEXIDINE GLUCONATE 40 MG/ML
30 SOLUTION TOPICAL DAILY
Qty: 473 ML | Refills: 0 | Status: SHIPPED | OUTPATIENT
Start: 2025-07-17

## 2025-07-17 RX ORDER — ACETAMINOPHEN 500 MG
1000 TABLET ORAL EVERY 6 HOURS PRN
Qty: 18 TABLET | Refills: 0 | Status: SHIPPED | OUTPATIENT
Start: 2025-07-17

## 2025-07-17 RX ORDER — NAPROXEN 250 MG/1
250 TABLET ORAL 2 TIMES DAILY
Qty: 12 TABLET | Refills: 0 | Status: SHIPPED | OUTPATIENT
Start: 2025-07-17

## 2025-08-07 ENCOUNTER — ANESTHESIA EVENT (OUTPATIENT)
Dept: PERIOP | Facility: HOSPITAL | Age: 40
End: 2025-08-07
Payer: MEDICAID

## 2025-08-07 RX ORDER — CEPHALEXIN 500 MG/1
500 CAPSULE ORAL 4 TIMES DAILY
COMMUNITY

## 2025-08-07 RX ORDER — LISINOPRIL 20 MG/1
20 TABLET ORAL DAILY
COMMUNITY

## 2025-08-07 RX ORDER — FLUTICASONE PROPIONATE 50 MCG
2 SPRAY, SUSPENSION (ML) NASAL DAILY PRN
COMMUNITY

## 2025-08-07 RX ORDER — LEVOTHYROXINE SODIUM 25 UG/1
25 TABLET ORAL NIGHTLY
COMMUNITY

## 2025-08-08 ENCOUNTER — HOSPITAL ENCOUNTER (OUTPATIENT)
Facility: HOSPITAL | Age: 40
Setting detail: HOSPITAL OUTPATIENT SURGERY
Discharge: HOME | End: 2025-08-08
Attending: SURGERY | Admitting: SURGERY
Payer: MEDICAID

## 2025-08-08 ENCOUNTER — ANESTHESIA (OUTPATIENT)
Dept: PERIOP | Facility: HOSPITAL | Age: 40
End: 2025-08-08
Payer: MEDICAID

## 2025-08-08 VITALS
RESPIRATION RATE: 18 BRPM | SYSTOLIC BLOOD PRESSURE: 116 MMHG | TEMPERATURE: 98.6 F | OXYGEN SATURATION: 96 % | DIASTOLIC BLOOD PRESSURE: 76 MMHG | WEIGHT: 192.46 LBS | HEART RATE: 68 BPM | HEIGHT: 57 IN | BODY MASS INDEX: 41.52 KG/M2

## 2025-08-08 DIAGNOSIS — N64.52 NIPPLE DISCHARGE, BLOODY: ICD-10-CM

## 2025-08-08 DIAGNOSIS — N62 MACROMASTIA: ICD-10-CM

## 2025-08-08 LAB
B-HCG UR QL: NEGATIVE
COTININE UR-MCNC: NEGATIVE NG/ML
QT INTERVAL: 386 MS
QTC INTERVAL: 416 MS

## 2025-08-08 PROCEDURE — 25010000002 SUGAMMADEX 200 MG/2ML SOLUTION

## 2025-08-08 PROCEDURE — 25010000002 PROPOFOL 10 MG/ML EMULSION: Performed by: NURSE ANESTHETIST, CERTIFIED REGISTERED

## 2025-08-08 PROCEDURE — 81025 URINE PREGNANCY TEST: CPT | Performed by: SURGERY

## 2025-08-08 PROCEDURE — 93010 ELECTROCARDIOGRAM REPORT: CPT | Performed by: INTERNAL MEDICINE

## 2025-08-08 PROCEDURE — 88305 TISSUE EXAM BY PATHOLOGIST: CPT | Performed by: SURGERY

## 2025-08-08 PROCEDURE — 19318 BREAST REDUCTION: CPT | Performed by: SURGERY

## 2025-08-08 PROCEDURE — 25010000002 DROPERIDOL PER 5 MG: Performed by: ANESTHESIOLOGY

## 2025-08-08 PROCEDURE — 88342 IMHCHEM/IMCYTCHM 1ST ANTB: CPT | Performed by: SURGERY

## 2025-08-08 PROCEDURE — 25010000002 ONDANSETRON PER 1 MG: Performed by: NURSE ANESTHETIST, CERTIFIED REGISTERED

## 2025-08-08 PROCEDURE — 25010000002 HYDROMORPHONE 1 MG/ML SOLUTION: Performed by: NURSE ANESTHETIST, CERTIFIED REGISTERED

## 2025-08-08 PROCEDURE — G0480 DRUG TEST DEF 1-7 CLASSES: HCPCS | Performed by: SURGERY

## 2025-08-08 PROCEDURE — 25010000002 DEXAMETHASONE PER 1 MG: Performed by: NURSE ANESTHETIST, CERTIFIED REGISTERED

## 2025-08-08 PROCEDURE — 93005 ELECTROCARDIOGRAM TRACING: CPT | Performed by: ANESTHESIOLOGY

## 2025-08-08 PROCEDURE — 25010000002 FENTANYL CITRATE (PF) 50 MCG/ML SOLUTION: Performed by: NURSE ANESTHETIST, CERTIFIED REGISTERED

## 2025-08-08 PROCEDURE — 25010000002 METOPROLOL TARTRATE 5 MG/5ML SOLUTION: Performed by: NURSE ANESTHETIST, CERTIFIED REGISTERED

## 2025-08-08 PROCEDURE — 25010000002 ONDANSETRON PER 1 MG

## 2025-08-08 PROCEDURE — 25010000002 BUPIVACAINE (PF) 0.5 % SOLUTION 10 ML VIAL: Performed by: SURGERY

## 2025-08-08 PROCEDURE — 25010000002 MIDAZOLAM PER 1MG: Performed by: ANESTHESIOLOGY

## 2025-08-08 PROCEDURE — 25010000002 CEFAZOLIN PER 500 MG: Performed by: SURGERY

## 2025-08-08 PROCEDURE — 25810000003 LACTATED RINGERS PER 1000 ML: Performed by: ANESTHESIOLOGY

## 2025-08-08 PROCEDURE — 25010000002 LIDOCAINE PF 2% 2 % SOLUTION: Performed by: NURSE ANESTHETIST, CERTIFIED REGISTERED

## 2025-08-08 PROCEDURE — 25010000002 DEXAMETHASONE SODIUM PHOSPHATE 10 MG/ML SOLUTION 1 ML VIAL: Performed by: SURGERY

## 2025-08-08 RX ORDER — SCOPOLAMINE 1 MG/3D
1 PATCH, EXTENDED RELEASE TRANSDERMAL ONCE
Status: DISCONTINUED | OUTPATIENT
Start: 2025-08-08 | End: 2025-08-08 | Stop reason: HOSPADM

## 2025-08-08 RX ORDER — MIDAZOLAM HYDROCHLORIDE 2 MG/2ML
2 INJECTION, SOLUTION INTRAMUSCULAR; INTRAVENOUS ONCE
Status: COMPLETED | OUTPATIENT
Start: 2025-08-08 | End: 2025-08-08

## 2025-08-08 RX ORDER — FENTANYL CITRATE 50 UG/ML
INJECTION, SOLUTION INTRAMUSCULAR; INTRAVENOUS AS NEEDED
Status: DISCONTINUED | OUTPATIENT
Start: 2025-08-08 | End: 2025-08-08 | Stop reason: SURG

## 2025-08-08 RX ORDER — OXYCODONE HYDROCHLORIDE 5 MG/1
5 TABLET ORAL
Status: DISCONTINUED | OUTPATIENT
Start: 2025-08-08 | End: 2025-08-08 | Stop reason: HOSPADM

## 2025-08-08 RX ORDER — ACETAMINOPHEN 500 MG
1000 TABLET ORAL ONCE
Status: DISCONTINUED | OUTPATIENT
Start: 2025-08-08 | End: 2025-08-08

## 2025-08-08 RX ORDER — PROPOFOL 10 MG/ML
VIAL (ML) INTRAVENOUS AS NEEDED
Status: DISCONTINUED | OUTPATIENT
Start: 2025-08-08 | End: 2025-08-08 | Stop reason: SURG

## 2025-08-08 RX ORDER — DEXMEDETOMIDINE HYDROCHLORIDE 100 UG/ML
INJECTION, SOLUTION INTRAVENOUS AS NEEDED
Status: DISCONTINUED | OUTPATIENT
Start: 2025-08-08 | End: 2025-08-08 | Stop reason: SURG

## 2025-08-08 RX ORDER — LIDOCAINE HYDROCHLORIDE 20 MG/ML
INJECTION, SOLUTION EPIDURAL; INFILTRATION; INTRACAUDAL; PERINEURAL AS NEEDED
Status: DISCONTINUED | OUTPATIENT
Start: 2025-08-08 | End: 2025-08-08 | Stop reason: SURG

## 2025-08-08 RX ORDER — TRANEXAMIC ACID 10 MG/ML
1000 INJECTION, SOLUTION INTRAVENOUS
Status: DISCONTINUED | OUTPATIENT
Start: 2025-08-08 | End: 2025-08-08 | Stop reason: HOSPADM

## 2025-08-08 RX ORDER — PROMETHAZINE HYDROCHLORIDE 25 MG/1
25 SUPPOSITORY RECTAL ONCE AS NEEDED
Status: DISCONTINUED | OUTPATIENT
Start: 2025-08-08 | End: 2025-08-08 | Stop reason: HOSPADM

## 2025-08-08 RX ORDER — DEXAMETHASONE SODIUM PHOSPHATE 4 MG/ML
INJECTION, SOLUTION INTRA-ARTICULAR; INTRALESIONAL; INTRAMUSCULAR; INTRAVENOUS; SOFT TISSUE AS NEEDED
Status: DISCONTINUED | OUTPATIENT
Start: 2025-08-08 | End: 2025-08-08 | Stop reason: SURG

## 2025-08-08 RX ORDER — SODIUM CHLORIDE, SODIUM LACTATE, POTASSIUM CHLORIDE, CALCIUM CHLORIDE 600; 310; 30; 20 MG/100ML; MG/100ML; MG/100ML; MG/100ML
9 INJECTION, SOLUTION INTRAVENOUS CONTINUOUS PRN
Status: DISCONTINUED | OUTPATIENT
Start: 2025-08-08 | End: 2025-08-08 | Stop reason: HOSPADM

## 2025-08-08 RX ORDER — ROCURONIUM BROMIDE 10 MG/ML
INJECTION, SOLUTION INTRAVENOUS AS NEEDED
Status: DISCONTINUED | OUTPATIENT
Start: 2025-08-08 | End: 2025-08-08 | Stop reason: SURG

## 2025-08-08 RX ORDER — ONDANSETRON 2 MG/ML
4 INJECTION INTRAMUSCULAR; INTRAVENOUS ONCE AS NEEDED
Status: DISCONTINUED | OUTPATIENT
Start: 2025-08-08 | End: 2025-08-08 | Stop reason: HOSPADM

## 2025-08-08 RX ORDER — SCOPOLAMINE 1 MG/3D
1 PATCH, EXTENDED RELEASE TRANSDERMAL CONTINUOUS
Status: DISCONTINUED | OUTPATIENT
Start: 2025-08-08 | End: 2025-08-08

## 2025-08-08 RX ORDER — METOPROLOL TARTRATE 1 MG/ML
INJECTION, SOLUTION INTRAVENOUS AS NEEDED
Status: DISCONTINUED | OUTPATIENT
Start: 2025-08-08 | End: 2025-08-08 | Stop reason: SURG

## 2025-08-08 RX ORDER — MIDAZOLAM HYDROCHLORIDE 2 MG/2ML
2 INJECTION, SOLUTION INTRAMUSCULAR; INTRAVENOUS ONCE
Status: DISCONTINUED | OUTPATIENT
Start: 2025-08-08 | End: 2025-08-08 | Stop reason: HOSPADM

## 2025-08-08 RX ORDER — DROPERIDOL 2.5 MG/ML
0.62 INJECTION, SOLUTION INTRAMUSCULAR; INTRAVENOUS ONCE AS NEEDED
Status: COMPLETED | OUTPATIENT
Start: 2025-08-08 | End: 2025-08-08

## 2025-08-08 RX ORDER — ONDANSETRON 2 MG/ML
INJECTION INTRAMUSCULAR; INTRAVENOUS AS NEEDED
Status: DISCONTINUED | OUTPATIENT
Start: 2025-08-08 | End: 2025-08-08 | Stop reason: SURG

## 2025-08-08 RX ORDER — KETAMINE HYDROCHLORIDE 10 MG/ML
INJECTION, SOLUTION INTRAMUSCULAR; INTRAVENOUS AS NEEDED
Status: DISCONTINUED | OUTPATIENT
Start: 2025-08-08 | End: 2025-08-08 | Stop reason: SURG

## 2025-08-08 RX ORDER — ACETAMINOPHEN 500 MG
1000 TABLET ORAL ONCE
Status: COMPLETED | OUTPATIENT
Start: 2025-08-08 | End: 2025-08-08

## 2025-08-08 RX ORDER — PROMETHAZINE HYDROCHLORIDE 25 MG/1
25 TABLET ORAL ONCE AS NEEDED
Status: DISCONTINUED | OUTPATIENT
Start: 2025-08-08 | End: 2025-08-08 | Stop reason: HOSPADM

## 2025-08-08 RX ADMIN — ONDANSETRON 4 MG: 2 INJECTION, SOLUTION INTRAMUSCULAR; INTRAVENOUS at 17:58

## 2025-08-08 RX ADMIN — DEXMEDETOMIDINE 10 MCG: 100 INJECTION, SOLUTION INTRAVENOUS at 15:39

## 2025-08-08 RX ADMIN — MIDAZOLAM HYDROCHLORIDE 2 MG: 1 INJECTION, SOLUTION INTRAMUSCULAR; INTRAVENOUS at 14:56

## 2025-08-08 RX ADMIN — PROPOFOL 200 MG: 10 INJECTION, EMULSION INTRAVENOUS at 15:27

## 2025-08-08 RX ADMIN — ROCURONIUM BROMIDE 30 MG: 10 INJECTION, SOLUTION INTRAVENOUS at 17:23

## 2025-08-08 RX ADMIN — SUGAMMADEX 200 MG: 100 INJECTION, SOLUTION INTRAVENOUS at 18:02

## 2025-08-08 RX ADMIN — METOROPROLOL TARTRATE 5 MG: 5 INJECTION, SOLUTION INTRAVENOUS at 16:02

## 2025-08-08 RX ADMIN — SCOPOLAMINE 1 PATCH: 1.5 PATCH, EXTENDED RELEASE TRANSDERMAL at 12:06

## 2025-08-08 RX ADMIN — HYDROMORPHONE HYDROCHLORIDE 0.5 MG: 1 INJECTION, SOLUTION INTRAMUSCULAR; INTRAVENOUS; SUBCUTANEOUS at 18:29

## 2025-08-08 RX ADMIN — FENTANYL CITRATE 100 MCG: 50 INJECTION, SOLUTION INTRAMUSCULAR; INTRAVENOUS at 15:27

## 2025-08-08 RX ADMIN — DEXMEDETOMIDINE 10 MCG: 100 INJECTION, SOLUTION INTRAVENOUS at 18:00

## 2025-08-08 RX ADMIN — OXYCODONE HYDROCHLORIDE 5 MG: 5 TABLET ORAL at 18:29

## 2025-08-08 RX ADMIN — LIDOCAINE HYDROCHLORIDE 100 MG: 20 INJECTION, SOLUTION EPIDURAL; INFILTRATION; INTRACAUDAL; PERINEURAL at 15:27

## 2025-08-08 RX ADMIN — DEXMEDETOMIDINE 10 MCG: 100 INJECTION, SOLUTION INTRAVENOUS at 16:45

## 2025-08-08 RX ADMIN — ROCURONIUM BROMIDE 50 MG: 10 INJECTION, SOLUTION INTRAVENOUS at 15:27

## 2025-08-08 RX ADMIN — SODIUM CHLORIDE 2 G: 9 INJECTION, SOLUTION INTRAVENOUS at 15:21

## 2025-08-08 RX ADMIN — ROCURONIUM BROMIDE 50 MG: 10 INJECTION, SOLUTION INTRAVENOUS at 16:18

## 2025-08-08 RX ADMIN — SODIUM CHLORIDE, POTASSIUM CHLORIDE, SODIUM LACTATE AND CALCIUM CHLORIDE 9 ML/HR: 600; 310; 30; 20 INJECTION, SOLUTION INTRAVENOUS at 12:03

## 2025-08-08 RX ADMIN — ACETAMINOPHEN 1000 MG: 500 TABLET, FILM COATED ORAL at 14:55

## 2025-08-08 RX ADMIN — DROPERIDOL 0.62 MG: 2.5 INJECTION, SOLUTION INTRAMUSCULAR; INTRAVENOUS at 19:16

## 2025-08-08 RX ADMIN — DEXAMETHASONE SODIUM PHOSPHATE 4 MG: 4 INJECTION, SOLUTION INTRAMUSCULAR; INTRAVENOUS at 15:22

## 2025-08-08 RX ADMIN — ONDANSETRON 4 MG: 2 INJECTION INTRAMUSCULAR; INTRAVENOUS at 18:59

## 2025-08-08 RX ADMIN — HYDROMORPHONE HYDROCHLORIDE 0.5 MG: 1 INJECTION, SOLUTION INTRAMUSCULAR; INTRAVENOUS; SUBCUTANEOUS at 15:50

## 2025-08-08 RX ADMIN — PROPOFOL 150 MCG/KG/MIN: 10 INJECTION, EMULSION INTRAVENOUS at 15:35

## 2025-08-08 RX ADMIN — KETAMINE HYDROCHLORIDE 50 MG: 10 INJECTION INTRAMUSCULAR; INTRAVENOUS at 15:27

## 2025-08-08 RX ADMIN — TRANEXAMIC ACID 1000 MG: 100 INJECTION, SOLUTION INTRAVENOUS at 15:32

## 2025-08-12 LAB
CYTO UR: NORMAL
LAB AP CASE REPORT: NORMAL
LAB AP CLINICAL INFORMATION: NORMAL
LAB AP SPECIAL STAINS: NORMAL
PATH REPORT.FINAL DX SPEC: NORMAL
PATH REPORT.GROSS SPEC: NORMAL

## 2025-08-14 ENCOUNTER — OFFICE VISIT (OUTPATIENT)
Dept: PLASTIC SURGERY | Facility: CLINIC | Age: 40
End: 2025-08-14
Payer: MEDICAID

## 2025-08-14 VITALS
OXYGEN SATURATION: 97 % | HEART RATE: 100 BPM | BODY MASS INDEX: 39.48 KG/M2 | DIASTOLIC BLOOD PRESSURE: 90 MMHG | WEIGHT: 183 LBS | HEIGHT: 57 IN | SYSTOLIC BLOOD PRESSURE: 125 MMHG

## 2025-08-14 DIAGNOSIS — N62 MACROMASTIA: Primary | ICD-10-CM

## 2025-08-14 PROCEDURE — 99024 POSTOP FOLLOW-UP VISIT: CPT | Performed by: SURGERY

## 2025-08-20 ENCOUNTER — OFFICE VISIT (OUTPATIENT)
Dept: PLASTIC SURGERY | Facility: CLINIC | Age: 40
End: 2025-08-20
Payer: MEDICAID

## 2025-08-20 VITALS
BODY MASS INDEX: 39.05 KG/M2 | DIASTOLIC BLOOD PRESSURE: 91 MMHG | OXYGEN SATURATION: 98 % | SYSTOLIC BLOOD PRESSURE: 128 MMHG | HEART RATE: 86 BPM | WEIGHT: 181 LBS | HEIGHT: 57 IN

## 2025-08-20 DIAGNOSIS — N62 MACROMASTIA: Primary | ICD-10-CM

## 2025-08-20 PROCEDURE — 99024 POSTOP FOLLOW-UP VISIT: CPT | Performed by: SURGERY

## (undated) DEVICE — DRSNG SURESITE WNDW 4X4.5

## (undated) DEVICE — Device

## (undated) DEVICE — KT LINEN QUICKSQUITE SAHARA STD DRW/LIFT 60X78IN

## (undated) DEVICE — RESERVOIR,SUCTION,100CC,SILICONE: Brand: MEDLINE

## (undated) DEVICE — SILVER COATED ANTI-MIRCOBIAL BARRIER DRESSING: Brand: ACTICOAT 4" X 4"

## (undated) DEVICE — GOWN,SIRUS,POLYRNF,BRTHSLV,XL,30/CS: Brand: MEDLINE

## (undated) DEVICE — INTENDED FOR TISSUE SEPARATION, AND OTHER PROCEDURES THAT REQUIRE A SHARP SURGICAL BLADE TO PUNCTURE OR CUT.: Brand: BARD-PARKER ® CARBON RIB-BACK BLADES

## (undated) DEVICE — GLV SURG DERMASSURE GRN LF PF SZ 6.5

## (undated) DEVICE — KT ORCA ORCAPOD DISP STRL

## (undated) DEVICE — SYR LL TP 10ML STRL

## (undated) DEVICE — PROXIMATE RH ROTATING HEAD SKIN STAPLERS (35 WIDE) CONTAINS 35 STAINLESS STEEL STAPLES: Brand: PROXIMATE

## (undated) DEVICE — THE STERILE LIGHT HANDLE COVER IS USED WITH STERIS SURGICAL LIGHTING AND VISUALIZATION SYSTEMS.

## (undated) DEVICE — TUBING, SUCTION, 1/4" X 10', STRAIGHT: Brand: MEDLINE

## (undated) DEVICE — ADAPT CLN BIOGUARD AIR/H2O DISP

## (undated) DEVICE — STERILE POLYISOPRENE POWDER-FREE SURGICAL GLOVES WITH EMOLLIENT COATING: Brand: PROTEXIS

## (undated) DEVICE — PLASTIC MAJOR-LF: Brand: MEDLINE INDUSTRIES, INC.

## (undated) DEVICE — SENSR O2 OXIMAX FNGR A/ 18IN NONSTR

## (undated) DEVICE — SEALANT WND FIBRIN TISSEEL PREFIL/SYR/PRIMAFZ 4ML

## (undated) DEVICE — ANTIBACTERIAL UNDYED BRAIDED (POLYGLACTIN 910), SYNTHETIC ABSORBABLE SUTURE: Brand: COATED VICRYL

## (undated) DEVICE — BIOPATCH™ ANTIMICROBIAL DRESSING WITH CHLORHEXIDINE GLUCONATE IS A HYDROPHILLIC POLYURETHANE ABSORPTIVE FOAM WITH CHLORHEXIDINE GLUCONATE (CHG) WHICH INHIBITS BACTERIAL GROWTH UNDER THE DRESSING. THE DRESSING IS INTENDED TO BE USED TO ABSORB EXUDATE, COVER A WOUND CAUSED BY VASCULAR AND NONVASCULAR PERCUTANEOUS MEDICAL DEVICES DURING SURGERY, AS WELL AS REDUCE LOCAL INFECTION AND COLONIZATION OF MICROORGANISMS.: Brand: BIOPATCH

## (undated) DEVICE — SLV SCD KN/LEN ADJ EXPRSS BLENDED MD 1P/U

## (undated) DEVICE — PENCL SMOKE/EVAC MEGADYNE TELESCP 10FT

## (undated) DEVICE — PAD,ABDOMINAL,8"X10",STERILE,LF,1/PK: Brand: MEDLINE

## (undated) DEVICE — STPLR SKIN SUBCUTICULAR INSORB 2030

## (undated) DEVICE — BITEBLOCK OMNI BLOC

## (undated) DEVICE — GLOVE,SURG,SENSICARE SLT,LF,PF,5.5: Brand: MEDLINE

## (undated) DEVICE — SUT ETHLN 3/0 FS1 663G

## (undated) DEVICE — LN SMPL CO2 SHTRM SD STREAM W/M LUER

## (undated) DEVICE — NEEDLE,22GX1.5",REG,BEVEL: Brand: MEDLINE

## (undated) DEVICE — SINGLE-USE BIOPSY FORCEPS: Brand: RADIAL JAW 4

## (undated) DEVICE — MARKER,SKIN,WI/RULER AND LABELS: Brand: MEDLINE

## (undated) DEVICE — CVR HNDL LT SURG ACCSSRY BLU STRL

## (undated) DEVICE — MASK,OXY,ADLT,NON-REB,SAFETY VENT,7,UC: Brand: MEDLINE